# Patient Record
Sex: MALE | Race: WHITE | Employment: OTHER | URBAN - METROPOLITAN AREA
[De-identification: names, ages, dates, MRNs, and addresses within clinical notes are randomized per-mention and may not be internally consistent; named-entity substitution may affect disease eponyms.]

---

## 2020-11-11 ENCOUNTER — TRANSCRIBE ORDER (OUTPATIENT)
Dept: SCHEDULING | Age: 65
End: 2020-11-11

## 2020-11-11 ENCOUNTER — HOSPITAL ENCOUNTER (OUTPATIENT)
Dept: CT IMAGING | Age: 65
Discharge: HOME | End: 2020-11-11
Attending: FAMILY MEDICINE
Payer: COMMERCIAL

## 2020-11-11 DIAGNOSIS — R51.9 HEADACHE, UNSPECIFIED: Primary | ICD-10-CM

## 2020-11-11 DIAGNOSIS — R51.9 HEADACHE, UNSPECIFIED: ICD-10-CM

## 2020-11-11 PROCEDURE — 70450 CT HEAD/BRAIN W/O DYE: CPT

## 2023-08-18 ENCOUNTER — ANESTHESIA (OUTPATIENT)
Facility: HOSPITAL | Age: 68
End: 2023-08-18

## 2023-08-18 ENCOUNTER — ANESTHESIA EVENT (OUTPATIENT)
Facility: HOSPITAL | Age: 68
End: 2023-08-18

## 2023-08-18 ENCOUNTER — HOSPITAL ENCOUNTER (OUTPATIENT)
Facility: HOSPITAL | Age: 68
Setting detail: OUTPATIENT SURGERY
Discharge: HOME | End: 2023-08-18
Attending: INTERNAL MEDICINE | Admitting: INTERNAL MEDICINE
Payer: MEDICAID

## 2023-08-18 VITALS
RESPIRATION RATE: 18 BRPM | HEIGHT: 71 IN | OXYGEN SATURATION: 98 % | SYSTOLIC BLOOD PRESSURE: 126 MMHG | BODY MASS INDEX: 30.1 KG/M2 | DIASTOLIC BLOOD PRESSURE: 71 MMHG | TEMPERATURE: 97.4 F | WEIGHT: 215 LBS | HEART RATE: 58 BPM

## 2023-08-18 DIAGNOSIS — Z12.11 COLON CANCER SCREENING: ICD-10-CM

## 2023-08-18 DIAGNOSIS — R19.4 BOWEL HABIT CHANGES: ICD-10-CM

## 2023-08-18 PROCEDURE — 88305 TISSUE EXAM BY PATHOLOGIST: CPT

## 2023-08-18 PROCEDURE — 2580000003 HC RX 258: Performed by: INTERNAL MEDICINE

## 2023-08-18 PROCEDURE — 2709999900 HC NON-CHARGEABLE SUPPLY: Performed by: INTERNAL MEDICINE

## 2023-08-18 PROCEDURE — 7100000011 HC PHASE II RECOVERY - ADDTL 15 MIN: Performed by: INTERNAL MEDICINE

## 2023-08-18 PROCEDURE — 7100000010 HC PHASE II RECOVERY - FIRST 15 MIN: Performed by: INTERNAL MEDICINE

## 2023-08-18 PROCEDURE — 3700000000 HC ANESTHESIA ATTENDED CARE: Performed by: INTERNAL MEDICINE

## 2023-08-18 PROCEDURE — 3600007512: Performed by: INTERNAL MEDICINE

## 2023-08-18 PROCEDURE — 3600007502: Performed by: INTERNAL MEDICINE

## 2023-08-18 PROCEDURE — 3700000001 HC ADD 15 MINUTES (ANESTHESIA): Performed by: INTERNAL MEDICINE

## 2023-08-18 RX ORDER — LISINOPRIL 5 MG/1
5 TABLET ORAL DAILY
COMMUNITY
Start: 2023-05-23

## 2023-08-18 RX ORDER — ROSUVASTATIN CALCIUM 20 MG/1
20 TABLET, COATED ORAL DAILY
COMMUNITY
Start: 2023-04-14

## 2023-08-18 RX ORDER — LIDOCAINE HYDROCHLORIDE 20 MG/ML
INJECTION, SOLUTION EPIDURAL; INFILTRATION; INTRACAUDAL; PERINEURAL PRN
Status: DISCONTINUED | OUTPATIENT
Start: 2023-08-18 | End: 2023-08-18 | Stop reason: SDUPTHER

## 2023-08-18 RX ORDER — SODIUM CHLORIDE, SODIUM LACTATE, POTASSIUM CHLORIDE, CALCIUM CHLORIDE 600; 310; 30; 20 MG/100ML; MG/100ML; MG/100ML; MG/100ML
INJECTION, SOLUTION INTRAVENOUS CONTINUOUS
Status: DISCONTINUED | OUTPATIENT
Start: 2023-08-18 | End: 2023-08-18 | Stop reason: HOSPADM

## 2023-08-18 RX ADMIN — SODIUM CHLORIDE, POTASSIUM CHLORIDE, SODIUM LACTATE AND CALCIUM CHLORIDE: 600; 310; 30; 20 INJECTION, SOLUTION INTRAVENOUS at 11:16

## 2023-08-18 RX ADMIN — LIDOCAINE HYDROCHLORIDE 100 MG: 20 INJECTION, SOLUTION EPIDURAL; INFILTRATION; INTRACAUDAL; PERINEURAL at 11:18

## 2023-08-18 ASSESSMENT — PAIN - FUNCTIONAL ASSESSMENT: PAIN_FUNCTIONAL_ASSESSMENT: 0-10

## 2023-08-18 NOTE — ANESTHESIA POSTPROCEDURE EVALUATION
Department of Anesthesiology  Postprocedure Note    Patient: Indy Baird  MRN: [id number]  YOB: 1955  Date of evaluation: 8/18/2023      Procedure Summary     Date: 08/18/23 Room / Location: [facility] 01 / [facility] ENDOSCOPY    Anesthesia Start: 1116 Anesthesia Stop: 1137    Procedure: COLONOSCOPY Diagnosis:       Bowel habit changes      Colon cancer screening      (Bowel habit changes [R19.4])      (Colon cancer screening [Z12.11])    Surgeons: Evelyn Frazier MD Responsible Provider: Esteban Quinn MD    Anesthesia Type: MAC ASA Status: 2          Anesthesia Type: MAC    Rohit Phase I: Rohit Score: 10    Rohit Phase II:        Anesthesia Post Evaluation    Patient location during evaluation: bedside (Endoscopy Unit)  Patient participation: complete - patient participated  Level of consciousness: sleepy but conscious  Pain score: 0  Airway patency: patent  Nausea & Vomiting: no nausea and no vomiting  Complications: no  Cardiovascular status: hemodynamically stable  Respiratory status: acceptable  Hydration status: stable  Comments: This patient remained on the stretcher. The patient was handed off to the endoscopy nursing team.  All questions regarding pre-, intra-, and postoperative care were answered.   Multimodal analgesia pain management approach

## 2023-08-18 NOTE — PROGRESS NOTES
IV removed-- tip intact, no redness, swelling or bleeding noted. VSS. Patient in no acute distress. DC instructions reviewed with patient and wife      -- verbalized understanding. Patient wheeled out to private vehicle-- no distress noted. Awaiting pathology results. Patient tolerated both snack/drink prior to discharge.

## 2024-07-18 ENCOUNTER — HOSPITAL ENCOUNTER (EMERGENCY)
Facility: HOSPITAL | Age: 69
Discharge: HOME | End: 2024-07-18
Attending: STUDENT IN AN ORGANIZED HEALTH CARE EDUCATION/TRAINING PROGRAM
Payer: MEDICAID

## 2024-07-18 VITALS
DIASTOLIC BLOOD PRESSURE: 85 MMHG | HEART RATE: 65 BPM | SYSTOLIC BLOOD PRESSURE: 130 MMHG | TEMPERATURE: 97.7 F | BODY MASS INDEX: 32.06 KG/M2 | HEIGHT: 71 IN | RESPIRATION RATE: 16 BRPM | OXYGEN SATURATION: 96 % | WEIGHT: 229 LBS
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
## 2024-07-18 DIAGNOSIS — M79.604 RIGHT LEG PAIN: Primary | ICD-10-CM

## 2024-07-18 PROCEDURE — 99283 EMERGENCY DEPT VISIT LOW MDM: CPT

## 2024-07-18 RX ORDER — AMOXICILLIN AND CLAVULANATE POTASSIUM 875; 125 MG/1; MG/1
TABLET, FILM COATED ORAL
COMMUNITY
Start: 2024-07-17

## 2024-07-18 RX ORDER — NAPROXEN 500 MG/1
TABLET ORAL
COMMUNITY
Start: 2024-07-17

## 2024-07-18 RX ORDER — METHOCARBAMOL 750 MG/1
750 TABLET, FILM COATED ORAL 4 TIMES DAILY
Qty: 40 TABLET | Refills: 0 | Status: SHIPPED | OUTPATIENT
Start: 2024-07-18 | End: 2024-07-28

## 2024-07-18 ASSESSMENT — PAIN DESCRIPTION - ORIENTATION: ORIENTATION: RIGHT

## 2024-07-18 ASSESSMENT — PAIN - FUNCTIONAL ASSESSMENT: PAIN_FUNCTIONAL_ASSESSMENT: 0-10

## 2024-07-18 ASSESSMENT — PAIN DESCRIPTION - LOCATION: LOCATION: TOE (COMMENT WHICH ONE)

## 2024-07-18 ASSESSMENT — PAIN SCALES - GENERAL: PAINLEVEL_OUTOF10: 10

## 2024-07-18 ASSESSMENT — PAIN DESCRIPTION - DESCRIPTORS: DESCRIPTORS: DISCOMFORT;ITCHING

## 2024-07-18 NOTE — DISCHARGE INSTRUCTIONS
Thank you for choosing our Emergency Department for your care.  It is our privilege to care for you in your time of need.  In the next several days, you may receive a survey via email or mailed to your home about your experience with our team.  We would greatly appreciate you taking a few minutes to complete the survey, as we use this information to learn what we have done well and what we could be doing better. Thank you for trusting us with your care!    Below you will find a list of your tests from today's visit.   Labs  No results found for this or any previous visit (from the past 12 hour(s)).    Radiologic Studies  Vascular duplex lower extremity venous right    (Results Pending)     ------------------------------------------------------------------------------------------------------------  The evaluation and treatment you received in the Emergency Department were for an urgent problem. It is important that you follow-up with a doctor, nurse practitioner, or physician assistant to:  (1) confirm your diagnosis,  (2) re-evaluation of changes in your illness and treatment, and (3) for ongoing care. Please take your discharge instructions with you when you go to your follow-up appointment.     If you have any problem arranging a follow-up appointment, contact us!  If your symptoms become worse or you do not improve as expected, please return to us. We are available 24 hours a day.     If a prescription has been provided, please fill it as soon as possible to prevent a delay in treatment. If you have any questions or reservations about taking the medication due to side effects or interactions with other medications, please call your primary care provider or contact us directly.  Again, THANK YOU for choosing us to care for YOU!

## 2024-07-18 NOTE — ED PROVIDER NOTES
wound.  No obvious redness or any evidence of infection.  Bilateral lower extremities appear symmetric in nature.  Describes sensation in right leg is itching.  Recommend antihistamine will give muscle laxer for intermittent cramps.. Recommend doppler only if symptoms worsen.  [JT]   1621 Was discharged with Augmentin and anti-inflammatory from patient first. [JT]   1629 Order Doppler as needed if symptoms persist [JT]      ED Course User Index  [JT] Tez Cabral PA-C       SEPSIS Reassessment: Sepsis reassessment not applicable    Clinical Management Tools:  Not Applicable    Patient was given the following medications:  Medications - No data to display    CONSULTS: See ED Course/MDM for further details.  None     Social Determinants affecting Diagnosis/Treatment: None    Smoking Cessation: Not Applicable    PROCEDURES   Unless otherwise noted above, none.  Procedures      CRITICAL CARE TIME   Patient does not meet Critical Care Time, 0 minutes    ED IMPRESSION     1. Right leg pain    2. Snake bite, subsequent encounter          DISPOSITION/PLAN   DISPOSITION Decision To Discharge 07/18/2024 04:19:34 PM    Discharge Note: The patient is stable for discharge home. The signs, symptoms, diagnosis, and discharge instructions have been discussed, understanding conveyed, and agreed upon. The patient is to follow up as recommended or return to ER should their symptoms worsen.      PATIENT REFERRED TO:  Eloisa Feldman MD  [address] [facility] [address] [phone]    Schedule an appointment as soon as possible for a visit       [facility] EMERGENCY DEPARTMENT  [address] [facility] [address] [phone]    If symptoms worsen        DISCHARGE MEDICATIONS:     Medication List        START taking these medications      methocarbamol 750 MG tablet  Commonly known as: Robaxin-750  Take 1 tablet by mouth 4 times daily for 10 days            ASK your doctor about these medications

## 2025-01-02 ENCOUNTER — HOSPITAL ENCOUNTER (OUTPATIENT)
Facility: HOSPITAL | Age: 70
Discharge: HOME | End: 2025-01-04
Payer: MEDICARE

## 2025-01-02 ENCOUNTER — HOSPITAL ENCOUNTER (EMERGENCY)
Facility: HOSPITAL | Age: 70
Discharge: HOME | End: 2025-01-02
Attending: FAMILY MEDICINE
Payer: MEDICARE

## 2025-01-02 ENCOUNTER — APPOINTMENT (OUTPATIENT)
Facility: HOSPITAL | Age: 70
End: 2025-01-02
Payer: MEDICARE

## 2025-01-02 ENCOUNTER — HOSPITAL ENCOUNTER (OUTPATIENT)
Facility: HOSPITAL | Age: 70
Setting detail: OBSERVATION
Discharge: HOME | End: 2025-01-04
Attending: STUDENT IN AN ORGANIZED HEALTH CARE EDUCATION/TRAINING PROGRAM | Admitting: INTERNAL MEDICINE
Payer: MEDICARE

## 2025-01-02 VITALS
BODY MASS INDEX: 30.1 KG/M2 | DIASTOLIC BLOOD PRESSURE: 95 MMHG | HEART RATE: 78 BPM | HEIGHT: 71 IN | WEIGHT: 215 LBS | RESPIRATION RATE: 18 BRPM | TEMPERATURE: 98.5 F | SYSTOLIC BLOOD PRESSURE: 156 MMHG | OXYGEN SATURATION: 98 %

## 2025-01-02 DIAGNOSIS — M79.89 SWELLING OF LEFT LOWER EXTREMITY: Primary | ICD-10-CM

## 2025-01-02 DIAGNOSIS — I82.412 DVT OF DEEP FEMORAL VEIN, LEFT (HCC): ICD-10-CM

## 2025-01-02 DIAGNOSIS — I82.492 ACUTE DEEP VEIN THROMBOSIS (DVT) OF OTHER SPECIFIED VEIN OF LEFT LOWER EXTREMITY (HCC): Primary | ICD-10-CM

## 2025-01-02 LAB
ALBUMIN SERPL-MCNC: 3.8 G/DL (ref 3.5–5)
ALBUMIN/GLOB SERPL: 0.9 (ref 1.1–2.2)
ALP SERPL-CCNC: 114 U/L (ref 45–117)
ALT SERPL-CCNC: 56 U/L (ref 12–78)
ANION GAP SERPL CALC-SCNC: 1 MMOL/L (ref 2–12)
ANION GAP SERPL CALC-SCNC: 5 MMOL/L (ref 2–12)
APTT PPP: 29.9 SEC (ref 21.2–34.1)
AST SERPL W P-5'-P-CCNC: 25 U/L (ref 15–37)
BILIRUB SERPL-MCNC: 0.5 MG/DL (ref 0.2–1)
BUN SERPL-MCNC: 15 MG/DL (ref 6–20)
BUN SERPL-MCNC: 22 MG/DL (ref 6–20)
BUN/CREAT SERPL: 14 (ref 12–20)
BUN/CREAT SERPL: 19 (ref 12–20)
CA-I BLD-MCNC: 8.3 MG/DL (ref 8.5–10.1)
CA-I BLD-MCNC: 9.3 MG/DL (ref 8.5–10.1)
CHLORIDE SERPL-SCNC: 105 MMOL/L (ref 97–108)
CHLORIDE SERPL-SCNC: 105 MMOL/L (ref 97–108)
CO2 SERPL-SCNC: 28 MMOL/L (ref 21–32)
CO2 SERPL-SCNC: 30 MMOL/L (ref 21–32)
CREAT SERPL-MCNC: 1.08 MG/DL (ref 0.7–1.3)
CREAT SERPL-MCNC: 1.18 MG/DL (ref 0.7–1.3)
ECHO BSA: 2.21 M2
ERYTHROCYTE [DISTWIDTH] IN BLOOD BY AUTOMATED COUNT: 13.2 % (ref 11.5–14.5)
GLOBULIN SER CALC-MCNC: 4.3 G/DL (ref 2–4)
GLUCOSE SERPL-MCNC: 119 MG/DL (ref 65–100)
GLUCOSE SERPL-MCNC: 141 MG/DL (ref 65–100)
HCT VFR BLD AUTO: 51 % (ref 36.6–50.3)
HGB BLD-MCNC: 17.2 G/DL (ref 12.1–17)
INR PPP: 0.9 (ref 0.9–1.1)
MCH RBC QN AUTO: 32.5 PG (ref 26–34)
MCHC RBC AUTO-ENTMCNC: 33.7 G/DL (ref 30–36.5)
MCV RBC AUTO: 96.4 FL (ref 80–99)
NRBC # BLD: 0 K/UL (ref 0–0.01)
NRBC BLD-RTO: 0 PER 100 WBC
PLATELET # BLD AUTO: 124 K/UL (ref 150–400)
PMV BLD AUTO: 11.4 FL (ref 8.9–12.9)
POTASSIUM SERPL-SCNC: 3.6 MMOL/L (ref 3.5–5.1)
POTASSIUM SERPL-SCNC: 3.9 MMOL/L (ref 3.5–5.1)
PROT SERPL-MCNC: 8.1 G/DL (ref 6.4–8.2)
PROTHROMBIN TIME: 12.9 SEC (ref 11.9–14.6)
RBC # BLD AUTO: 5.29 M/UL (ref 4.1–5.7)
SODIUM SERPL-SCNC: 136 MMOL/L (ref 136–145)
SODIUM SERPL-SCNC: 138 MMOL/L (ref 136–145)
THERAPEUTIC RANGE: NORMAL SEC (ref 82–109)
UFH PPP CHRO-ACNC: <0.1 IU/ML
WBC # BLD AUTO: 10.1 K/UL (ref 4.1–11.1)

## 2025-01-02 PROCEDURE — 6360000004 HC RX CONTRAST MEDICATION: Performed by: STUDENT IN AN ORGANIZED HEALTH CARE EDUCATION/TRAINING PROGRAM

## 2025-01-02 PROCEDURE — 80053 COMPREHEN METABOLIC PANEL: CPT

## 2025-01-02 PROCEDURE — 85730 THROMBOPLASTIN TIME PARTIAL: CPT

## 2025-01-02 PROCEDURE — 99284 EMERGENCY DEPT VISIT MOD MDM: CPT

## 2025-01-02 PROCEDURE — 99285 EMERGENCY DEPT VISIT HI MDM: CPT

## 2025-01-02 PROCEDURE — G0378 HOSPITAL OBSERVATION PER HR: HCPCS

## 2025-01-02 PROCEDURE — 2500000003 HC RX 250 WO HCPCS: Performed by: INTERNAL MEDICINE

## 2025-01-02 PROCEDURE — 96375 TX/PRO/DX INJ NEW DRUG ADDON: CPT

## 2025-01-02 PROCEDURE — 96374 THER/PROPH/DIAG INJ IV PUSH: CPT

## 2025-01-02 PROCEDURE — 85610 PROTHROMBIN TIME: CPT

## 2025-01-02 PROCEDURE — 6360000002 HC RX W HCPCS: Performed by: STUDENT IN AN ORGANIZED HEALTH CARE EDUCATION/TRAINING PROGRAM

## 2025-01-02 PROCEDURE — 74177 CT ABD & PELVIS W/CONTRAST: CPT

## 2025-01-02 PROCEDURE — 96366 THER/PROPH/DIAG IV INF ADDON: CPT

## 2025-01-02 PROCEDURE — 6370000000 HC RX 637 (ALT 250 FOR IP): Performed by: INTERNAL MEDICINE

## 2025-01-02 PROCEDURE — 96376 TX/PRO/DX INJ SAME DRUG ADON: CPT

## 2025-01-02 PROCEDURE — 93971 EXTREMITY STUDY: CPT

## 2025-01-02 PROCEDURE — 36415 COLL VENOUS BLD VENIPUNCTURE: CPT

## 2025-01-02 PROCEDURE — 99222 1ST HOSP IP/OBS MODERATE 55: CPT | Performed by: SURGERY

## 2025-01-02 PROCEDURE — 85520 HEPARIN ASSAY: CPT

## 2025-01-02 PROCEDURE — 80048 BASIC METABOLIC PNL TOTAL CA: CPT

## 2025-01-02 PROCEDURE — 96365 THER/PROPH/DIAG IV INF INIT: CPT

## 2025-01-02 PROCEDURE — 6360000002 HC RX W HCPCS: Performed by: INTERNAL MEDICINE

## 2025-01-02 PROCEDURE — 85027 COMPLETE CBC AUTOMATED: CPT

## 2025-01-02 RX ORDER — HEPARIN SODIUM 10000 [USP'U]/100ML
5-30 INJECTION, SOLUTION INTRAVENOUS CONTINUOUS
Status: DISCONTINUED | OUTPATIENT
Start: 2025-01-02 | End: 2025-01-04

## 2025-01-02 RX ORDER — IOPAMIDOL 755 MG/ML
100 INJECTION, SOLUTION INTRAVASCULAR
Status: COMPLETED | OUTPATIENT
Start: 2025-01-02 | End: 2025-01-02

## 2025-01-02 RX ORDER — MAGNESIUM SULFATE IN WATER 40 MG/ML
2000 INJECTION, SOLUTION INTRAVENOUS PRN
Status: DISCONTINUED | OUTPATIENT
Start: 2025-01-02 | End: 2025-01-04 | Stop reason: HOSPADM

## 2025-01-02 RX ORDER — HEPARIN SODIUM 1000 [USP'U]/ML
80 INJECTION, SOLUTION INTRAVENOUS; SUBCUTANEOUS PRN
Status: DISCONTINUED | OUTPATIENT
Start: 2025-01-02 | End: 2025-01-04

## 2025-01-02 RX ORDER — ROSUVASTATIN CALCIUM 5 MG/1
20 TABLET, COATED ORAL NIGHTLY
Status: DISCONTINUED | OUTPATIENT
Start: 2025-01-02 | End: 2025-01-04 | Stop reason: HOSPADM

## 2025-01-02 RX ORDER — ONDANSETRON 2 MG/ML
4 INJECTION INTRAMUSCULAR; INTRAVENOUS EVERY 6 HOURS PRN
Status: DISCONTINUED | OUTPATIENT
Start: 2025-01-02 | End: 2025-01-04 | Stop reason: HOSPADM

## 2025-01-02 RX ORDER — HYDROCODONE BITARTRATE AND ACETAMINOPHEN 5; 325 MG/1; MG/1
1 TABLET ORAL EVERY 6 HOURS PRN
Status: DISCONTINUED | OUTPATIENT
Start: 2025-01-02 | End: 2025-01-04 | Stop reason: HOSPADM

## 2025-01-02 RX ORDER — HYDROCODONE BITARTRATE AND ACETAMINOPHEN 5; 325 MG/1; MG/1
2 TABLET ORAL EVERY 6 HOURS PRN
Status: DISCONTINUED | OUTPATIENT
Start: 2025-01-02 | End: 2025-01-04 | Stop reason: HOSPADM

## 2025-01-02 RX ORDER — SODIUM CHLORIDE 9 MG/ML
INJECTION, SOLUTION INTRAVENOUS PRN
Status: DISCONTINUED | OUTPATIENT
Start: 2025-01-02 | End: 2025-01-04 | Stop reason: HOSPADM

## 2025-01-02 RX ORDER — ACETAMINOPHEN 650 MG/1
650 SUPPOSITORY RECTAL EVERY 6 HOURS PRN
Status: DISCONTINUED | OUTPATIENT
Start: 2025-01-02 | End: 2025-01-04 | Stop reason: HOSPADM

## 2025-01-02 RX ORDER — HEPARIN SODIUM 1000 [USP'U]/ML
80 INJECTION, SOLUTION INTRAVENOUS; SUBCUTANEOUS ONCE
Status: COMPLETED | OUTPATIENT
Start: 2025-01-02 | End: 2025-01-02

## 2025-01-02 RX ORDER — LISINOPRIL 10 MG/1
5 TABLET ORAL DAILY
Status: DISCONTINUED | OUTPATIENT
Start: 2025-01-02 | End: 2025-01-04 | Stop reason: HOSPADM

## 2025-01-02 RX ORDER — POTASSIUM CHLORIDE 7.45 MG/ML
10 INJECTION INTRAVENOUS PRN
Status: DISCONTINUED | OUTPATIENT
Start: 2025-01-02 | End: 2025-01-04 | Stop reason: HOSPADM

## 2025-01-02 RX ORDER — MORPHINE SULFATE 2 MG/ML
2 INJECTION, SOLUTION INTRAMUSCULAR; INTRAVENOUS EVERY 4 HOURS PRN
Status: DISCONTINUED | OUTPATIENT
Start: 2025-01-02 | End: 2025-01-02

## 2025-01-02 RX ORDER — SODIUM CHLORIDE 0.9 % (FLUSH) 0.9 %
5-40 SYRINGE (ML) INJECTION PRN
Status: DISCONTINUED | OUTPATIENT
Start: 2025-01-02 | End: 2025-01-04 | Stop reason: HOSPADM

## 2025-01-02 RX ORDER — MORPHINE SULFATE 4 MG/ML
4 INJECTION, SOLUTION INTRAMUSCULAR; INTRAVENOUS EVERY 4 HOURS PRN
Status: DISCONTINUED | OUTPATIENT
Start: 2025-01-02 | End: 2025-01-04 | Stop reason: HOSPADM

## 2025-01-02 RX ORDER — MORPHINE SULFATE 2 MG/ML
2 INJECTION, SOLUTION INTRAMUSCULAR; INTRAVENOUS EVERY 4 HOURS PRN
Status: DISCONTINUED | OUTPATIENT
Start: 2025-01-02 | End: 2025-01-04 | Stop reason: HOSPADM

## 2025-01-02 RX ORDER — POLYETHYLENE GLYCOL 3350 17 G/17G
17 POWDER, FOR SOLUTION ORAL DAILY PRN
Status: DISCONTINUED | OUTPATIENT
Start: 2025-01-02 | End: 2025-01-04 | Stop reason: HOSPADM

## 2025-01-02 RX ORDER — SENNOSIDES 8.6 MG
650 CAPSULE ORAL EVERY 8 HOURS PRN
Qty: 12 TABLET | Refills: 0 | Status: SHIPPED | OUTPATIENT
Start: 2025-01-02 | End: 2025-01-06

## 2025-01-02 RX ORDER — HYDROCODONE BITARTRATE AND ACETAMINOPHEN 7.5; 325 MG/1; MG/1
1 TABLET ORAL EVERY 4 HOURS PRN
Status: DISCONTINUED | OUTPATIENT
Start: 2025-01-02 | End: 2025-01-02

## 2025-01-02 RX ORDER — POTASSIUM CHLORIDE 1500 MG/1
40 TABLET, EXTENDED RELEASE ORAL PRN
Status: DISCONTINUED | OUTPATIENT
Start: 2025-01-02 | End: 2025-01-04 | Stop reason: HOSPADM

## 2025-01-02 RX ORDER — SODIUM CHLORIDE 0.9 % (FLUSH) 0.9 %
5-40 SYRINGE (ML) INJECTION EVERY 12 HOURS SCHEDULED
Status: DISCONTINUED | OUTPATIENT
Start: 2025-01-02 | End: 2025-01-04 | Stop reason: HOSPADM

## 2025-01-02 RX ORDER — HEPARIN SODIUM 1000 [USP'U]/ML
40 INJECTION, SOLUTION INTRAVENOUS; SUBCUTANEOUS PRN
Status: DISCONTINUED | OUTPATIENT
Start: 2025-01-02 | End: 2025-01-04

## 2025-01-02 RX ORDER — ONDANSETRON 4 MG/1
4 TABLET, ORALLY DISINTEGRATING ORAL EVERY 8 HOURS PRN
Status: DISCONTINUED | OUTPATIENT
Start: 2025-01-02 | End: 2025-01-04 | Stop reason: HOSPADM

## 2025-01-02 RX ORDER — MORPHINE SULFATE 4 MG/ML
4 INJECTION, SOLUTION INTRAMUSCULAR; INTRAVENOUS
Status: COMPLETED | OUTPATIENT
Start: 2025-01-02 | End: 2025-01-02

## 2025-01-02 RX ORDER — ACETAMINOPHEN 325 MG/1
650 TABLET ORAL EVERY 6 HOURS PRN
Status: DISCONTINUED | OUTPATIENT
Start: 2025-01-02 | End: 2025-01-04 | Stop reason: HOSPADM

## 2025-01-02 RX ORDER — MORPHINE SULFATE 2 MG/ML
2 INJECTION, SOLUTION INTRAMUSCULAR; INTRAVENOUS
Status: COMPLETED | OUTPATIENT
Start: 2025-01-02 | End: 2025-01-02

## 2025-01-02 RX ADMIN — HEPARIN SODIUM 7800 UNITS: 1000 INJECTION INTRAVENOUS; SUBCUTANEOUS at 11:40

## 2025-01-02 RX ADMIN — HYDROCODONE BITARTRATE AND ACETAMINOPHEN 1 TABLET: 5; 325 TABLET ORAL at 23:48

## 2025-01-02 RX ADMIN — ROSUVASTATIN CALCIUM 20 MG: 5 TABLET, FILM COATED ORAL at 20:38

## 2025-01-02 RX ADMIN — MORPHINE SULFATE 4 MG: 4 INJECTION, SOLUTION INTRAMUSCULAR; INTRAVENOUS at 20:39

## 2025-01-02 RX ADMIN — IOPAMIDOL 100 ML: 755 INJECTION, SOLUTION INTRAVENOUS at 13:00

## 2025-01-02 RX ADMIN — LISINOPRIL 5 MG: 10 TABLET ORAL at 20:38

## 2025-01-02 RX ADMIN — MORPHINE SULFATE 2 MG: 2 INJECTION, SOLUTION INTRAMUSCULAR; INTRAVENOUS at 16:39

## 2025-01-02 RX ADMIN — HEPARIN SODIUM 18 UNITS/KG/HR: 10000 INJECTION, SOLUTION INTRAVENOUS at 11:43

## 2025-01-02 RX ADMIN — SODIUM CHLORIDE, PRESERVATIVE FREE 10 ML: 5 INJECTION INTRAVENOUS at 20:44

## 2025-01-02 RX ADMIN — MORPHINE SULFATE 2 MG: 2 INJECTION, SOLUTION INTRAMUSCULAR; INTRAVENOUS at 11:38

## 2025-01-02 RX ADMIN — MORPHINE SULFATE 4 MG: 4 INJECTION, SOLUTION INTRAMUSCULAR; INTRAVENOUS at 13:36

## 2025-01-02 ASSESSMENT — PAIN SCALES - GENERAL
PAINLEVEL_OUTOF10: 8
PAINLEVEL_OUTOF10: 9
PAINLEVEL_OUTOF10: 7
PAINLEVEL_OUTOF10: 10
PAINLEVEL_OUTOF10: 6
PAINLEVEL_OUTOF10: 9
PAINLEVEL_OUTOF10: 10
PAINLEVEL_OUTOF10: 6

## 2025-01-02 ASSESSMENT — PAIN DESCRIPTION - DESCRIPTORS: DESCRIPTORS: STABBING

## 2025-01-02 ASSESSMENT — LIFESTYLE VARIABLES
HOW MANY STANDARD DRINKS CONTAINING ALCOHOL DO YOU HAVE ON A TYPICAL DAY: PATIENT DOES NOT DRINK
HOW OFTEN DO YOU HAVE A DRINK CONTAINING ALCOHOL: NEVER

## 2025-01-02 ASSESSMENT — PAIN DESCRIPTION - ORIENTATION
ORIENTATION: LEFT
ORIENTATION: LEFT

## 2025-01-02 ASSESSMENT — PAIN DESCRIPTION - LOCATION
LOCATION: LEG
LOCATION: LEG

## 2025-01-02 ASSESSMENT — PAIN - FUNCTIONAL ASSESSMENT
PAIN_FUNCTIONAL_ASSESSMENT: 0-10
PAIN_FUNCTIONAL_ASSESSMENT: 0-10

## 2025-01-02 NOTE — ACP (ADVANCE CARE PLANNING)
Advance Care Planning     Advance Care Planning Inpatient Note  [facility] Department    Today's Date: 1/2/2025  Unit: [facility] EMERGENCY DEPT    Received request from patient.  Upon review of chart and communication with care team, patient's decision making abilities are not in question.. Patient and Spouse was/were present in the room during visit.    Goals of ACP Conversation:  Discuss advance care planning documents    Health Care Decision Makers:       Primary Decision Maker: Lakeisha Sanchez ISIDRO. - Child - [phone]    Secondary Decision Maker: Smid,Corinne W. - Spouse - [phone]  Summary:  Completed New Documents  Verified Healthcare Decision Maker  Updated Healthcare Decision Maker    Advance Care Planning Documents (Patient Wishes):  Healthcare Power of /Advance Directive Appointment of Health Care Agent  Living Will/Advance Directive  Anatomical Gift/Organ Donation     Assessment:   responded to pt's request to complete an Advance Medical Directive (AMD). Patient's secondary decision maker is present in the room and assists with completing the document per the patient's request. AMD completed as requested.    Interventions:  Provided education on documents for clarity and greater understanding    Care Preferences Communicated:   No    Outcomes/Plan:  ACP Discussion: Completed  New advance directive completed.  Returned original document(s) to patient, as well as copies for distribution to appointed agents  Copy of advance directive given to staff to scan into medical record.    Electronically signed by CHRISS Hart on 1/2/2025 at 4:14 PM

## 2025-01-02 NOTE — CONSULTS
11:31 AM     01/02/2025 11:31 AM    CO2 30 01/02/2025 11:31 AM    BUN 22 01/02/2025 11:31 AM     Lab Results   Component Value Date/Time    INR 0.9 01/02/2025 11:31 AM       REVIEW OF SYSTEMS   (Positive findings are bolded, all others negative)  Constitutional:  Fever, Chills, Night sweats, Unintentional weight loss, Weight gain, Anorexia, Fatigue, Somnolence  Eyes:  Vision loss, Vision change, Eye pain, Redness, Discharge  ENT:  Otalgia, Otorrhea, Hearing loss, Tinnitus, Epistaxis, Rhinorrhea, Sinus Congestion, Sore throat, Oral lesions, Tooth pain, Bleeding gums, Dysphonia, Dysphagia, Neck pain  Cardiovascular:  Chest pain, Palpitations, Dyspnea on exertion, Orthopnea, Paroxysmal nocturnal dyspnea, Leg swelling, Claudication  Respiratory:  Cough, Sputum production, Hemoptysis, Wheezing, Snoring, Shortness of breath  Gastrointestinal:  Nausea, Vomiting, Abdominal pain, Dyspepsia, Diarrhea, Constipation, Hematochezia, Melena, Encopresis  Genitourinary:  Pelvic pain, Dysuria, Frequency, Urgency, Hematuria, Retention, Incontinence, Testicular pain, Scrotal mass / Swelling, Erectile dysfunction, Menorrhagia, Metrorrhagia, Postmenopausal bleeding, Dysmenorrhea, Discharge  Musculoskeletal:  Back pain, Joint pain, Joint swelling, Muscle pain, Muscle spasm  Integumentary:  Rash, Pruritus, Dryness, Discoloration, Non-healing sores / Open wounds, Breast lumps, Mastalgia, Galactorrhea, Alopecia  Neurological:  Headache, Weakness, Paresthesias, Memory loss, Seizures, Dizziness, Syncope, Ataxia, Tremor  Psychiatric:  Anxiety, Depression, Irritability, Insomnia, Paranoia, Hallucinations, Suicidal ideations  Endocrine:  Heat / Cold intolerance, Polydipsia, Polyphagia  Hematologic / Lymphatic:  Lymphadenopathy, Bleeding disorder  Allergic / Immunologic / Infectious:  Urticaria, Seasonal / Environmental allergies, Persistent / Recurrent infection    PHYSICAL EXAM   Blood pressure 137/83, pulse 57, temperature 98.1 °F (36.7  °C), temperature source Oral, resp. rate 14, height 1.803 m (5' 11\"), weight 102.1 kg (225 lb), SpO2 95%.  General:  Calm, cooperative, NAD  HEENT:  NCAT, EOMI, conjunctiva clear, MMM  Heart:  RRR, S1S2 normal, jacqueline (50's)  Lungs:  NWOB  Abdomen:  Soft, NT, ND  Extremities:  MAEW, no cyanosis; LLE edema without warmth or redness. L DP and PT pulses 2+.  Skin:  Warm and dry, color normal, no rashes  Neurological:  AAOX3, speech clear and coherent    ASSESSMENT   This is a 69 y.o. male with LLE DVT involving L CVF (mobile non-occlusive), FV, SFV, pop V, gastroc V, soleus V, DP V and TP V.     PLAN   68 yo male with  extensive LLE DVT.  Heparin drip is first line treatment.  Recommend CT abd/pel with IV contrast with delayed venous phase (100 sec delay) to assess for thrombus above common fem V.   Mechanical thrombectomy not indicated at the moment- re-eval for thrombectomy if thrombus is present in iliac or IVC.  IVC filter not indicated as long as patient tolerates systemic anticoagulation.  Recommend Hematology consult.    Case discussed with Dr. Betsy Paul.    Thank you for asking us to participate in the care of this patient.      Deisi Montgomery PA-C  [facility]      CC:  Unruly Trejo MD

## 2025-01-02 NOTE — PROGRESS NOTES
Heparin Infusion Initiation  Shaheed Hair is a 69 y.o. male starting heparin for:  DVT  Heparin dosing: order for weight based protocol  Initial Dosing Weight: 102.1 kg (Recorded body weight)    Factor Xa inhibitor/LMWH use within the past 72 hours? No  If yes, date and time of last administration: N/A  Hypertriglyceridemia (> 690 mg/dL) or hyperbilirubinemia (> 37 mg/dL conjugated bilirubin, >14 mg/dL unconjugated bilirubin) present? No      Assessment/Plan:   Heparin to be monitored using anti-Xa for duration of therapy  Initial bolus ordered: Yes  Starting rate:  18 unit/kg/hr  PRN boluses entered: Yes

## 2025-01-02 NOTE — PROGRESS NOTES
Patient admitted to unit. Patient with family member at bedside, is alert and oriented x4 able to answer questions. Admission process in progress.

## 2025-01-02 NOTE — H&P
V2.0  History and Physical      Name:  Shaheed Hair /Age/Sex: 1955  (69 y.o. male)   MRN & CSN:  [id number] & [id number] Encounter Date/Time: 2025 4:26 PM EST   Location:   PCP: Eloisa Feldman MD       Hospital Day: 1    Assessment and Plan:   Shaheed Hair is a 69 y.o. male with a pmh of hypertension, hyperlipidemia, prediabetes mellitus, erlichiosis with recurrence who presents with pain and swelling in the left lower extremity.    Hospital Problems             Last Modified POA    * (Principal) DVT of deep femoral vein, left (HCC) 2025 Yes    Hypertension 2025 Yes    Hyperlipidemia 2025 Yes         Principal Problem:    DVT of deep femoral vein, left (HCC)  Plan:   Admit for pain control  Consult general surgery who will manage the patient long-term  Currently no indication for mechanical thrombectomy that I can determine  Will add PSA because patient does not know if it is ever been done  Have asked patient and spouse to inquire from their GI specialist when he will next be due for colonoscopy since they are not certain when that is  Continue weight-based heparin with likely transition to NOAC prior to discharge  Have discussed with the patient that likely minimum 6 months therapy is needed but with the extent of his clot some physicians might recommend even long-term anticoagulation.    Active Problems:    Hypertension  Patient not taking his medication thinking that when he takes it in his cholesterol medication that the 2 drugs together are causing his muscle pain.  I discussed with him that it is probably the cholesterol medicine and further discussed the importance of good blood pressure control.  Plan:   Patient should discuss with PCP his current management or lack of hypertension      Hyperlipidemia  Plan:   If Crestor has not already been tried and that might be a good choice as it has less of a tendency to cause myalgia.  This too should be discussed with primary care

## 2025-01-02 NOTE — PROGRESS NOTES
Spiritual Health History and Assessment/Progress Note  [facility]    Advance Care Planning,  , Adjustment to illness,      Name: Shaheed Hair MRN: [id number]    Age: 69 y.o.     Sex: male   Language: English   Catholic: None   DVT of deep femoral vein, left (HCC)     Date: 1/2/2025            Total Time Calculated: 78 min              Spiritual Assessment began in [facility] EMERGENCY DEPT        Referral/Consult From: Patient   Encounter Overview/Reason: Advance Care Planning  Service Provided For: Patient and family together    Vilma, Belief, Meaning:   Patient identifies as spiritual, is connected with a vilma tradition or spiritual practice, has beliefs or practices that help with coping during difficult times, and Other: Caodaism  Family/Friends identify as spiritual, are connected with a vilma tradition or spiritual practice, have beliefs or practices that help with coping during difficult times, and Other: Caodaism      Importance and Influence:  Patient has spiritual/personal beliefs that influence decisions regarding their health  Family/Friends have spiritual/personal beliefs that influence decisions regarding the patient's health    Community:  Patient feels well-supported. Support system includes: Spouse/Partner, Children, and Extended family  Family/Friends are connected with a spiritual community: and feel well-supported. Support system includes: Spouse/Partner, Children, and Extended family    Assessment and Plan of Care:     Patient Interventions include: Facilitated expression of thoughts and feelings, Explored spiritual coping/struggle/distress, Engaged in theological reflection, Affirmed coping skills/support systems, Facilitated life review and/ or legacy, and Other: Provided reflective listening, prayer, scripture, gentle touch, grief care, explored coping strategies, and a peaceful and supportive presence.  Family/Friends Interventions include: Facilitated expression of thoughts and  feelings, Explored spiritual coping/struggle/distress, Engaged in theological reflection, Affirmed coping skills/support systems, Facilitated life review and/or legacy, and Other: Provided reflective listening, prayer, scripture, explored coping strategies, gentle touch, and a peaceful and supportive presence.    Patient Plan of Care: Spiritual Care available upon further referral  Family/Friends Plan of Care: Spiritual Care available upon further referral     responded to pt's request to complete an Advance Medical Directive (AMD). Patient's secondary decision maker is present in the room and assists with completing the document per the patient's request. AMD completed as requested.    Pt/spouse are present in the room. They share their concerns regarding patient's current health condition. Pt discloses his journey as he recovered from the loss of his first wife. Pt/spouse discussed family dynamics, their brisa in God, and their hope for improvement in the patient's health. They are not affiliated with a Christian, however, they engage in Mandaen, prayer, and Bible reading privately in their home. Pt/spouse joined hands with the  as the  prayed; they were receptive to prayer and expressed their appreciation for the support.  Electronically signed by CHRISS Hart on 1/2/2025 at 4:19 PM

## 2025-01-02 NOTE — ED TRIAGE NOTES
Pt had vascular ultrasound this morning. Ultrasound showed +DVT throughout Left lower leg. Left Leg swelling for 2 days.

## 2025-01-02 NOTE — ED TRIAGE NOTES
Reports started yesterday with lower leg pain and swelling. Reports pain 10/10. Feels as if leg is cramping.  Reports swelling improved some with elevating leg. Denies hx of blood clot.

## 2025-01-02 NOTE — ED PROVIDER NOTES
EMERGENCY DEPARTMENT HISTORY AND PHYSICAL EXAM      Date: 1/2/2025  Patient Name: Shaheed Hair  MRN: [id number]  YOB: 1955  Date of evaluation: 1/2/2025  Provider: Unruly Trejo MD     History of Present Illness     Chief Complaint   Patient presents with    DVT       History Provided By: Patient    HPI: Shaheed Hair, 69 y.o. male with past medical history as listed and reviewed below presenting to the ED for evaluation of a DVT.  [facility].  Ultrasound was conducted and the patient was found to have a left lower extremity DVT with possible mobile area in the left common femoral.  Per the patient he reports he had the flu over the last week and was not getting out of bed much.  He developed pain over the last 2 days and had swelling in the area.    Medical History     Past Medical History:  Past Medical History:   Diagnosis Date    Arthritis     Hyperlipidemia     Hypertension        Past Surgical History:  Past Surgical History:   Procedure Laterality Date    COLONOSCOPY N/A 8/18/2023    COLONOSCOPY performed by Carole Sherwood MD at [facility] ENDOSCOPY    COLONOSCOPY N/A 8/18/2023    COLONOSCOPY WITH BIOPSY performed by Carole Sherwood MD at [facility] ENDOSCOPY       Family History:  No family history on file.    Social History:  Social History     Tobacco Use    Smoking status: Never    Smokeless tobacco: Never   Vaping Use    Vaping status: Never Used   Substance Use Topics    Alcohol use: Not Currently    Drug use: Yes     Types: Marijuana (Weed)     Comment: to sleep at night       Allergies:  No Known Allergies    PCP: Eloisa Feldman MD    Current Medications:   Current Facility-Administered Medications   Medication Dose Route Frequency Provider Last Rate Last Admin    heparin (porcine) injection 7,800 Units  80 Units/kg IntraVENous PRN Unruly Trejo MD        heparin (porcine) injection 3,900 Units  40 Units/kg IntraVENous PRN Maya  MD  01/02/25 1558

## 2025-01-02 NOTE — ED NOTES
ED TO INPATIENT SBAR HANDOFF    Patient Name: Shaheed Hair   Preferred Name: Shaheed  : 1955  69 y.o.   Family/Caregiver Present: no   Code Status Order: No Order  PO Status: NPO:No  Telemetry Order: No  C-SSRS: Risk of Suicide: No Risk  Sitter no   Restraints:     Sepsis Risk Score      Situation  Chief Complaint   Patient presents with    DVT     Brief Description of Patient's Condition: DVT in pt's left lower extremity  Mental Status: oriented and alert  Arrived from:Home  Imaging:   CT ABDOMEN PELVIS W IV CONTRAST Additional Contrast? None   Final Result         1. No evidence for venous thrombus in the IVC, iliac veins, or common femoral   veins.   2. Hepatic steatosis.         Electronically signed by Betsy Paul        Abnormal labs:   Abnormal Labs Reviewed   COMPREHENSIVE METABOLIC PANEL - Abnormal; Notable for the following components:       Result Value    Anion Gap 1 (*)     Glucose 119 (*)     BUN 22 (*)     Globulin 4.3 (*)     Albumin/Globulin Ratio 0.9 (*)     All other components within normal limits   CBC - Abnormal; Notable for the following components:    Hemoglobin 17.2 (*)     Hematocrit 51.0 (*)     Platelets 124 (*)     All other components within normal limits       Background  Allergies: No Known Allergies  History:   Past Medical History:   Diagnosis Date    Arthritis     Hyperlipidemia     Hypertension        Assessment  Vitals: MEWS Score: 0  Level of Consciousness: Alert (0)   Vitals:    25 1630 25 1645 25 1700 25 1711   BP: 139/85  (!) 143/81    Pulse: 58 72 58 58   Resp:       Temp:       TempSrc:       SpO2: 94% 97% 95% 97%   Weight:       Height:         Deterioration Index (DI): Deterioration Index: 23.05  Deterioration Index (DI) Interventions Performed:    O2 Flow Rate:    O2 Device: O2 Device: None (Room air)  Cardiac Rhythm:    Critical Lab Results: [unfilled]  Cultures: Cultures:None  NIH Score: NIH     Active LDA's:   Peripheral IV 25  Left;Proximal;Anterior Forearm (Active)       Peripheral IV 01/02/25 Proximal;Right;Anterior Forearm (Active)     Active Central Lines:                          Active Wounds:    Active Chi's:    Active Feeding Tubes:      Administered Medications:   Medications   heparin (porcine) injection 7,800 Units (has no administration in time range)   heparin (porcine) injection 3,900 Units (has no administration in time range)   heparin 25,000 units in dextrose 5% 250 mL (premix) infusion (18 Units/kg/hr × 97.5 kg IntraVENous New Bag 1/2/25 1143)   HYDROcodone-acetaminophen (NORCO) 7.5-325 MG per tablet 1 tablet (has no administration in time range)     And   morphine (PF) injection 2 mg (2 mg IntraVENous Given 1/2/25 1639)   heparin (porcine) injection 7,800 Units (7,800 Units IntraVENous Given 1/2/25 1140)   morphine (PF) injection 2 mg (2 mg IntraVENous Given 1/2/25 1138)   iopamidol (ISOVUE-370) 76 % injection 100 mL (100 mLs IntraVENous Given 1/2/25 1300)   morphine (PF) injection 4 mg (4 mg IntraVENous Given 1/2/25 1336)     Last documented pain medication administration: morphine IV @ 1639  Pertinent or High Risk Medications/Drips: no   If Yes, please provide details: heprin drip running at 18 units/kg/hr  Blood Product Administration: no  If Yes, please provide details: n/a  Process Protocols/Bundles: Sepsis Protocol/Bundle Completion-DONE!    Recommendation  Incomplete STAT orders: see chart  Overdue Medications: see MAR  Patient Belongings:    Additional Comments: drawing blue top before sending up, next will be at [phone]  If any further questions, please call Sending RN at [phone]      Admitting Unit Notification  Name of person notified and time: Vashti      Electronically signed by: Electronically signed by Dominic Cornejo RN on 1/2/2025 at 5:14 PM

## 2025-01-02 NOTE — ED PROVIDER NOTES
EMERGENCY DEPARTMENT HISTORY AND PHYSICAL EXAM      Date: 1/2/2025  Patient Name: Shaheed Hair    History of Presenting Illness     Chief Complaint   Patient presents with    Leg Pain       History Provided By:     HPI: Shaheed Hair, is a very pleasant 69 y.o. male presenting to the ED with a chief complaint of leg pain.  Recently developed left lower calf pain and swelling.  No redness nor warmth.  No symptoms in either leg.  No chest pain or shortness of breath.  No orthopnea.  Denies injuries.  No numbness tingling or weakness in extremity.    Denies any other symptoms at this time.    PCP: Eloisa Feldman MD    No current facility-administered medications on file prior to encounter.     Current Outpatient Medications on File Prior to Encounter   Medication Sig Dispense Refill    amoxicillin-clavulanate (AUGMENTIN) 875-125 MG per tablet       naproxen (NAPROSYN) 500 MG tablet       lisinopril (PRINIVIL;ZESTRIL) 5 MG tablet Take 1 tablet by mouth daily      rosuvastatin (CRESTOR) 20 MG tablet Take 1 tablet by mouth Daily         Past History     Past Medical History:  Past Medical History:   Diagnosis Date    Arthritis     Hyperlipidemia     Hypertension        Past Surgical History:  Past Surgical History:   Procedure Laterality Date    COLONOSCOPY N/A 8/18/2023    COLONOSCOPY performed by Carole Sherwood MD at [facility] ENDOSCOPY    COLONOSCOPY N/A 8/18/2023    COLONOSCOPY WITH BIOPSY performed by Carole Sherwood MD at [facility] ENDOSCOPY       Family History:  History reviewed. No pertinent family history.    Social History:  Social History     Tobacco Use    Smoking status: Never    Smokeless tobacco: Never   Vaping Use    Vaping status: Never Used   Substance Use Topics    Alcohol use: Not Currently    Drug use: Yes     Types: Marijuana (Weed)     Comment: to sleep at night       Allergies:  No Known Allergies      Review of Systems     Negative unless otherwise stated in HPI    Physical Exam     Physical

## 2025-01-03 LAB
BASOPHILS # BLD: 0.1 K/UL (ref 0–0.1)
BASOPHILS NFR BLD: 1 % (ref 0–1)
DIFFERENTIAL METHOD BLD: ABNORMAL
EOSINOPHIL # BLD: 0.2 K/UL (ref 0–0.4)
EOSINOPHIL NFR BLD: 2 % (ref 0–7)
ERYTHROCYTE [DISTWIDTH] IN BLOOD BY AUTOMATED COUNT: 13 % (ref 11.5–14.5)
HCT VFR BLD AUTO: 45.5 % (ref 36.6–50.3)
HGB BLD-MCNC: 15.4 G/DL (ref 12.1–17)
IMM GRANULOCYTES # BLD AUTO: 0 K/UL (ref 0–0.04)
IMM GRANULOCYTES NFR BLD AUTO: 1 % (ref 0–0.5)
LYMPHOCYTES # BLD: 2.2 K/UL (ref 0.8–3.5)
LYMPHOCYTES NFR BLD: 26 % (ref 12–49)
MCH RBC QN AUTO: 32.3 PG (ref 26–34)
MCHC RBC AUTO-ENTMCNC: 33.8 G/DL (ref 30–36.5)
MCV RBC AUTO: 95.4 FL (ref 80–99)
MONOCYTES # BLD: 0.9 K/UL (ref 0–1)
MONOCYTES NFR BLD: 11 % (ref 5–13)
NEUTS SEG # BLD: 5 K/UL (ref 1.8–8)
NEUTS SEG NFR BLD: 59 % (ref 32–75)
NRBC # BLD: 0 K/UL (ref 0–0.01)
NRBC BLD-RTO: 0 PER 100 WBC
PLATELET # BLD AUTO: 117 K/UL (ref 150–400)
PMV BLD AUTO: 11.8 FL (ref 8.9–12.9)
RBC # BLD AUTO: 4.77 M/UL (ref 4.1–5.7)
UFH PPP CHRO-ACNC: 0.5 IU/ML
UFH PPP CHRO-ACNC: 0.56 IU/ML
UFH PPP CHRO-ACNC: 0.73 IU/ML
UFH PPP CHRO-ACNC: 0.73 IU/ML
WBC # BLD AUTO: 8.3 K/UL (ref 4.1–11.1)

## 2025-01-03 PROCEDURE — 85520 HEPARIN ASSAY: CPT

## 2025-01-03 PROCEDURE — 96376 TX/PRO/DX INJ SAME DRUG ADON: CPT

## 2025-01-03 PROCEDURE — 2500000003 HC RX 250 WO HCPCS: Performed by: INTERNAL MEDICINE

## 2025-01-03 PROCEDURE — G0103 PSA SCREENING: HCPCS

## 2025-01-03 PROCEDURE — G0378 HOSPITAL OBSERVATION PER HR: HCPCS

## 2025-01-03 PROCEDURE — 36415 COLL VENOUS BLD VENIPUNCTURE: CPT

## 2025-01-03 PROCEDURE — 99232 SBSQ HOSP IP/OBS MODERATE 35: CPT | Performed by: SURGERY

## 2025-01-03 PROCEDURE — 6360000002 HC RX W HCPCS: Performed by: STUDENT IN AN ORGANIZED HEALTH CARE EDUCATION/TRAINING PROGRAM

## 2025-01-03 PROCEDURE — 6360000002 HC RX W HCPCS: Performed by: INTERNAL MEDICINE

## 2025-01-03 PROCEDURE — 96366 THER/PROPH/DIAG IV INF ADDON: CPT

## 2025-01-03 PROCEDURE — 6370000000 HC RX 637 (ALT 250 FOR IP): Performed by: INTERNAL MEDICINE

## 2025-01-03 PROCEDURE — 85025 COMPLETE CBC W/AUTO DIFF WBC: CPT

## 2025-01-03 RX ADMIN — HYDROCODONE BITARTRATE AND ACETAMINOPHEN 1 TABLET: 5; 325 TABLET ORAL at 16:56

## 2025-01-03 RX ADMIN — HEPARIN SODIUM 17 UNITS/KG/HR: 10000 INJECTION, SOLUTION INTRAVENOUS at 15:50

## 2025-01-03 RX ADMIN — LISINOPRIL 5 MG: 10 TABLET ORAL at 09:32

## 2025-01-03 RX ADMIN — SODIUM CHLORIDE, PRESERVATIVE FREE 10 ML: 5 INJECTION INTRAVENOUS at 22:13

## 2025-01-03 RX ADMIN — HYDROCODONE BITARTRATE AND ACETAMINOPHEN 1 TABLET: 5; 325 TABLET ORAL at 23:07

## 2025-01-03 RX ADMIN — HEPARIN SODIUM 18 UNITS/KG/HR: 10000 INJECTION, SOLUTION INTRAVENOUS at 02:13

## 2025-01-03 RX ADMIN — MORPHINE SULFATE 2 MG: 2 INJECTION, SOLUTION INTRAMUSCULAR; INTRAVENOUS at 08:03

## 2025-01-03 RX ADMIN — ROSUVASTATIN CALCIUM 20 MG: 5 TABLET, FILM COATED ORAL at 22:12

## 2025-01-03 RX ADMIN — SODIUM CHLORIDE, PRESERVATIVE FREE 10 ML: 5 INJECTION INTRAVENOUS at 09:32

## 2025-01-03 ASSESSMENT — ENCOUNTER SYMPTOMS
SHORTNESS OF BREATH: 0
ABDOMINAL PAIN: 0
GASTROINTESTINAL NEGATIVE: 1
RESPIRATORY NEGATIVE: 1
COUGH: 0
NAUSEA: 0
VOMITING: 0

## 2025-01-03 ASSESSMENT — PAIN DESCRIPTION - ORIENTATION
ORIENTATION: LEFT
ORIENTATION: LEFT

## 2025-01-03 ASSESSMENT — PAIN SCALES - GENERAL
PAINLEVEL_OUTOF10: 3
PAINLEVEL_OUTOF10: 6
PAINLEVEL_OUTOF10: 6
PAINLEVEL_OUTOF10: 4
PAINLEVEL_OUTOF10: 6
PAINLEVEL_OUTOF10: 3
PAINLEVEL_OUTOF10: 2

## 2025-01-03 ASSESSMENT — PAIN DESCRIPTION - DESCRIPTORS
DESCRIPTORS: ACHING
DESCRIPTORS: ACHING

## 2025-01-03 ASSESSMENT — PAIN DESCRIPTION - LOCATION
LOCATION: LEG
LOCATION: LEG

## 2025-01-03 ASSESSMENT — PAIN SCALES - WONG BAKER: WONGBAKER_NUMERICALRESPONSE: HURTS A LITTLE BIT

## 2025-01-03 NOTE — PROGRESS NOTES
PROGRESS NOTE      Chief Complaints:  No new complaint.  HPI and  Objective:    Pain is better.  Denies any chest pain shortness breath.  Review of Systems:  Rest of review of system reviewed personally and they are negative.    EXAM:  /61   Pulse 57   Temp 98.1 °F (36.7 °C) (Oral)   Resp 14   Ht 1.803 m (5' 11\")   Wt 102.1 kg (225 lb)   SpO2 92%   BMI 31.38 kg/m²     Patient is awake   Head and neck atraumatic, normocephalic.  ENT: No hoarse voice, gaze appropriate.  Cardiac system regular rate rhythm.  Pulmonary no audible wheeze, no cyanosis.  Chest wall excursion normal with respiration cycle  Abdomen is soft not particularly distended.  Neurologically nonfocal.  Hematology system: No bruising noted.  Musculoskeletal system: No joint deformity noted.  Genitourinary system: No hematuria noted.  Skin is warm and moist.  Psychosocial: Cooperative.  Vascular examination as previously noted no changes.    Current Facility-Administered Medications   Medication Dose Route Frequency Provider Last Rate Last Admin    heparin (porcine) injection 7,800 Units  80 Units/kg IntraVENous PRN Unruly Trejo MD        heparin (porcine) injection 3,900 Units  40 Units/kg IntraVENous PRN Unruly Trejo MD        heparin 25,000 units in dextrose 5% 250 mL (premix) infusion  5-30 Units/kg/hr IntraVENous Continuous Unruly Trejo MD 17.6 mL/hr at 01/03/25 0213 18 Units/kg/hr at 01/03/25 0213    lisinopril (PRINIVIL;ZESTRIL) tablet 5 mg  5 mg Oral Daily Lauri Jha MD   5 mg at 01/02/25 2038    rosuvastatin (CRESTOR) tablet 20 mg  20 mg Oral Nightly Lauri Jha MD   20 mg at 01/02/25 2038    sodium chloride flush 0.9 % injection 5-40 mL  5-40 mL IntraVENous 2 times per day Lauri Jha MD   10 mL at 01/02/25 2044    sodium chloride flush 0.9 % injection 5-40 mL  5-40 mL IntraVENous PRN Lauri Jha MD        0.9 % sodium chloride infusion   IntraVENous PRN Lauri Jha  MD        potassium chloride (KLOR-CON M) extended release tablet 40 mEq  40 mEq Oral PRN Lauri Jha MD        Or    potassium bicarb-citric acid (EFFER-K) effervescent tablet 40 mEq  40 mEq Oral PRN Lauri Jha MD        Or    potassium chloride 10 mEq/100 mL IVPB (Peripheral Line)  10 mEq IntraVENous PRN Lauri Jha MD        magnesium sulfate 2000 mg in 50 mL IVPB premix  2,000 mg IntraVENous PRN Lauri Jha MD        ondansetron (ZOFRAN-ODT) disintegrating tablet 4 mg  4 mg Oral Q8H PRN Lauri Jha MD        Or    ondansetron (ZOFRAN) injection 4 mg  4 mg IntraVENous Q6H PRN Larui Jha MD        polyethylene glycol (GLYCOLAX) packet 17 g  17 g Oral Daily PRN Lauri Jha MD        acetaminophen (TYLENOL) tablet 650 mg  650 mg Oral Q6H PRN Lauri Jha MD        Or    acetaminophen (TYLENOL) suppository 650 mg  650 mg Rectal Q6H PRN Lauri Jha MD        HYDROcodone-acetaminophen (NORCO) 5-325 MG per tablet 1 tablet  1 tablet Oral Q6H PRN Lauri Jha MD   1 tablet at 01/02/25 2348    And    HYDROcodone-acetaminophen (NORCO) 5-325 MG per tablet 2 tablet  2 tablet Oral Q6H PRN Lauri Jha MD        morphine (PF) injection 2 mg  2 mg IntraVENous Q4H PRN Lauri Jha MD        And    morphine (PF) injection 4 mg  4 mg IntraVENous Q4H PRN Lauri Jha MD   4 mg at 01/02/25 2039           Recent Results (from the past 24 hour(s))   Vascular duplex lower extremity venous left    Collection Time: 01/02/25 10:50 AM   Result Value Ref Range    Body Surface Area 2.21 m2   Comprehensive Metabolic Panel    Collection Time: 01/02/25 11:31 AM   Result Value Ref Range    Sodium 136 136 - 145 mmol/L    Potassium 3.9 3.5 - 5.1 mmol/L    Chloride 105 97 - 108 mmol/L    CO2 30 21 - 32 mmol/L    Anion Gap 1 (L) 2 - 12 mmol/L    Glucose 119 (H) 65 - 100 mg/dL    BUN 22 (H) 6 - 20 mg/dL    Creatinine 1.18 0.70 - 1.30 mg/dL

## 2025-01-03 NOTE — CARE COORDINATION
01/03/25 1347   Service Assessment   Patient Orientation Alert and Oriented   Cognition Alert   History Provided By Patient   Primary Caregiver Self   Accompanied By/Relationship alone   Support Systems Family Members;Spouse/Significant Other   Patient's Healthcare Decision Maker is: Named in Scanned ACP Document   PCP Verified by CM Yes   Last Visit to PCP Within last 6 months   Prior Functional Level Independent in ADLs/IADLs   Current Functional Level Independent in ADLs/IADLs   Can patient return to prior living arrangement Yes   Ability to make needs known: Good   Family able to assist with home care needs: Yes   Would you like for me to discuss the discharge plan with any other family members/significant others, and if so, who? Yes   Financial Resources Medicare;Medicaid   Community Resources None     In observation status. Lives at address on file. Independent at baseline, uses walking staff.    Medicaid for transport.     Discharge plan is home once clinically stable.     Advance Care Planning   Healthcare Decision Maker:    Primary Decision Maker: Lakeisha Sanchez. - Child - [phone]    Secondary Decision Maker: Smid,Corinne W. - Spouse - [phone]    Click here to complete Healthcare Decision Makers including selection of the Healthcare Decision Maker Relationship (ie \"Primary\").

## 2025-01-03 NOTE — CONSULTS
General surgery history and Physical    Patient: Shaheed Hair  MRN: [id number]    YOB: 1955  Age: 69 y.o.  Sex: male     Chief Complaint:  Chief Complaint   Patient presents with    DVT       History of Present Illness: Shaheed Hair is a 69 y.o. very pleasant gentleman examined at the emergency room.  I was consulted for left leg DVT.    Patient otherwise pretty active healthy gentleman had a flu symptoms for about a week and after that patient noted to have left leg swelling and pain.  Patient denies any chest pain shortness of breath.  Patient denies prior history of trauma.  Denies any personal history of DVT or family history of DVT.  Denies a personal history of any cancer.        Social History:  Social Connections: Not on file       Past Medical History:  Past Medical History:   Diagnosis Date    Arthritis     Hyperlipidemia     Hypertension      Surgical History:  Past Surgical History:   Procedure Laterality Date    COLONOSCOPY N/A 8/18/2023    COLONOSCOPY performed by Carole Sherwood MD at [facility] ENDOSCOPY    COLONOSCOPY N/A 8/18/2023    COLONOSCOPY WITH BIOPSY performed by Carole Sherwood MD at [facility] ENDOSCOPY       Allergies:  No Known Allergies    Current Meds:  Current Facility-Administered Medications   Medication Dose Route Frequency Provider Last Rate Last Admin    heparin (porcine) injection 7,800 Units  80 Units/kg IntraVENous PRN Unruly Trejo MD        heparin (porcine) injection 3,900 Units  40 Units/kg IntraVENous PRN Unruly Trejo MD        heparin 25,000 units in dextrose 5% 250 mL (premix) infusion  5-30 Units/kg/hr IntraVENous Continuous Unruly Trejo MD 17.6 mL/hr at 01/03/25 0213 18 Units/kg/hr at 01/03/25 0213    lisinopril (PRINIVIL;ZESTRIL) tablet 5 mg  5 mg Oral Daily Lauri Jha MD   5 mg at 01/02/25 2038    rosuvastatin (CRESTOR) tablet 20 mg  20 mg Oral Nightly Lauri Jha MD   20 mg at 01/02/25 2038    sodium chloride flush 0.9 % injection 5-40  lb)   SpO2 92%   BMI 31.38 kg/m²   Gen: Well developed, well nourished 69 y.o. male in no acute distress  Head: normocephalic, atraumatic  EYES: Pupils are equal and reactive.  Chest wall: Excursion normal with respiration cycle, no obvious audible wheeze.  Mouth: Clear, no overt lesions, oral mucosa pink and moist  Neck: supple, no masses, no adenopathy or carotid bruits, trachea midline  Resp: clear to auscultation bilaterally, no wheeze, rhonchi or rales, excursions normal and symmetrical  Cardio: Regular rate and rhythm, no murmurs, clicks, gallops or rubs, no edema or varicosities  Abdomen: Soft nontender  Neuro: sensation and strength grossly intact and symmetrical  Psych: alert and oriented to person, place and time  Skin: warm and moist.  Hematologic system: No bruising.  Vascular examination: Intact in lower extremity.  Labs:  Recent Results (from the past 24 hour(s))   Vascular duplex lower extremity venous left    Collection Time: 01/02/25 10:50 AM   Result Value Ref Range    Body Surface Area 2.21 m2   Comprehensive Metabolic Panel    Collection Time: 01/02/25 11:31 AM   Result Value Ref Range    Sodium 136 136 - 145 mmol/L    Potassium 3.9 3.5 - 5.1 mmol/L    Chloride 105 97 - 108 mmol/L    CO2 30 21 - 32 mmol/L    Anion Gap 1 (L) 2 - 12 mmol/L    Glucose 119 (H) 65 - 100 mg/dL    BUN 22 (H) 6 - 20 mg/dL    Creatinine 1.18 0.70 - 1.30 mg/dL    BUN/Creatinine Ratio 19 12 - 20      Est, Glom Filt Rate 66 >60 ml/min/1.73m2    Calcium 9.3 8.5 - 10.1 mg/dL    Total Bilirubin 0.5 0.2 - 1.0 mg/dL    AST 25 15 - 37 U/L    ALT 56 12 - 78 U/L    Alk Phosphatase 114 45 - 117 U/L    Total Protein 8.1 6.4 - 8.2 g/dL    Albumin 3.8 3.5 - 5.0 g/dL    Globulin 4.3 (H) 2.0 - 4.0 g/dL    Albumin/Globulin Ratio 0.9 (L) 1.1 - 2.2     CBC    Collection Time: 01/02/25 11:31 AM   Result Value Ref Range    WBC 10.1 4.1 - 11.1 K/uL    RBC 5.29 4.10 - 5.70 M/uL    Hemoglobin 17.2 (H) 12.1 - 17.0 g/dL    Hematocrit 51.0 (H)

## 2025-01-04 VITALS
SYSTOLIC BLOOD PRESSURE: 115 MMHG | HEIGHT: 71 IN | DIASTOLIC BLOOD PRESSURE: 63 MMHG | BODY MASS INDEX: 31.5 KG/M2 | HEART RATE: 50 BPM | TEMPERATURE: 97.9 F | WEIGHT: 225 LBS | OXYGEN SATURATION: 97 % | RESPIRATION RATE: 17 BRPM

## 2025-01-04 LAB
ANION GAP SERPL CALC-SCNC: 5 MMOL/L (ref 2–12)
BASOPHILS # BLD: 0.1 K/UL (ref 0–0.1)
BASOPHILS NFR BLD: 1 % (ref 0–1)
BUN SERPL-MCNC: 16 MG/DL (ref 6–20)
BUN/CREAT SERPL: 15 (ref 12–20)
CA-I BLD-MCNC: 9.1 MG/DL (ref 8.5–10.1)
CHLORIDE SERPL-SCNC: 106 MMOL/L (ref 97–108)
CO2 SERPL-SCNC: 26 MMOL/L (ref 21–32)
CREAT SERPL-MCNC: 1.06 MG/DL (ref 0.7–1.3)
DIFFERENTIAL METHOD BLD: ABNORMAL
EOSINOPHIL # BLD: 0.2 K/UL (ref 0–0.4)
EOSINOPHIL NFR BLD: 3 % (ref 0–7)
ERYTHROCYTE [DISTWIDTH] IN BLOOD BY AUTOMATED COUNT: 13 % (ref 11.5–14.5)
GLUCOSE SERPL-MCNC: 111 MG/DL (ref 65–100)
HCT VFR BLD AUTO: 51.3 % (ref 36.6–50.3)
HGB BLD-MCNC: 17.1 G/DL (ref 12.1–17)
IMM GRANULOCYTES # BLD AUTO: 0.1 K/UL (ref 0–0.04)
IMM GRANULOCYTES NFR BLD AUTO: 1 % (ref 0–0.5)
LYMPHOCYTES # BLD: 2.3 K/UL (ref 0.8–3.5)
LYMPHOCYTES NFR BLD: 31 % (ref 12–49)
MCH RBC QN AUTO: 32.1 PG (ref 26–34)
MCHC RBC AUTO-ENTMCNC: 33.3 G/DL (ref 30–36.5)
MCV RBC AUTO: 96.2 FL (ref 80–99)
MONOCYTES # BLD: 0.9 K/UL (ref 0–1)
MONOCYTES NFR BLD: 12 % (ref 5–13)
NEUTS SEG # BLD: 4.1 K/UL (ref 1.8–8)
NEUTS SEG NFR BLD: 52 % (ref 32–75)
NRBC # BLD: 0 K/UL (ref 0–0.01)
NRBC BLD-RTO: 0 PER 100 WBC
PLATELET # BLD AUTO: 126 K/UL (ref 150–400)
PMV BLD AUTO: 11.2 FL (ref 8.9–12.9)
POTASSIUM SERPL-SCNC: 4.2 MMOL/L (ref 3.5–5.1)
PSA SERPL-MCNC: 0.2 NG/ML (ref 0.01–4)
RBC # BLD AUTO: 5.33 M/UL (ref 4.1–5.7)
SODIUM SERPL-SCNC: 137 MMOL/L (ref 136–145)
UFH PPP CHRO-ACNC: 0.57 IU/ML
WBC # BLD AUTO: 7.6 K/UL (ref 4.1–11.1)

## 2025-01-04 PROCEDURE — 85520 HEPARIN ASSAY: CPT

## 2025-01-04 PROCEDURE — 36415 COLL VENOUS BLD VENIPUNCTURE: CPT

## 2025-01-04 PROCEDURE — 80048 BASIC METABOLIC PNL TOTAL CA: CPT

## 2025-01-04 PROCEDURE — G0378 HOSPITAL OBSERVATION PER HR: HCPCS

## 2025-01-04 PROCEDURE — 6370000000 HC RX 637 (ALT 250 FOR IP)

## 2025-01-04 PROCEDURE — 96366 THER/PROPH/DIAG IV INF ADDON: CPT

## 2025-01-04 PROCEDURE — 99232 SBSQ HOSP IP/OBS MODERATE 35: CPT | Performed by: SURGERY

## 2025-01-04 PROCEDURE — 85025 COMPLETE CBC W/AUTO DIFF WBC: CPT

## 2025-01-04 PROCEDURE — 6370000000 HC RX 637 (ALT 250 FOR IP): Performed by: INTERNAL MEDICINE

## 2025-01-04 PROCEDURE — 6360000002 HC RX W HCPCS: Performed by: STUDENT IN AN ORGANIZED HEALTH CARE EDUCATION/TRAINING PROGRAM

## 2025-01-04 RX ORDER — HYDROCODONE BITARTRATE AND ACETAMINOPHEN 5; 325 MG/1; MG/1
1 TABLET ORAL EVERY 8 HOURS PRN
Qty: 9 TABLET | Refills: 0 | Status: SHIPPED | OUTPATIENT
Start: 2025-01-04 | End: 2025-01-07

## 2025-01-04 RX ADMIN — HEPARIN SODIUM 17 UNITS/KG/HR: 10000 INJECTION, SOLUTION INTRAVENOUS at 08:46

## 2025-01-04 RX ADMIN — APIXABAN 5 MG: 5 TABLET, FILM COATED ORAL at 12:36

## 2025-01-04 RX ADMIN — LISINOPRIL 5 MG: 10 TABLET ORAL at 08:50

## 2025-01-04 RX ADMIN — HYDROCODONE BITARTRATE AND ACETAMINOPHEN 1 TABLET: 5; 325 TABLET ORAL at 14:33

## 2025-01-04 ASSESSMENT — PAIN DESCRIPTION - LOCATION: LOCATION: LEG

## 2025-01-04 ASSESSMENT — PAIN SCALES - GENERAL: PAINLEVEL_OUTOF10: 8

## 2025-01-04 ASSESSMENT — PAIN DESCRIPTION - ORIENTATION: ORIENTATION: LEFT

## 2025-01-04 NOTE — CARE COORDINATION
DC order observed.     No further needs from CM at this time. Pt is cleared for dc from CM standpoint.       Transition of Care Plan:    RUR: n/a, obs  Prior Level of Functioning: independent  Disposition: home self care  YAHIR: 1/4/2025  Follow up appointments: unit secretary or primary nurse to setup as needed  DME needed: none  Transportation at discharge: arranged by pt  IM/IMM Medicare/[employer] letter given: n/a, obs  Is patient a  and connected with [facility]? no  Caregiver Contact: n/a  Care Conference needed? no  Barriers to discharge: none

## 2025-01-04 NOTE — DISCHARGE SUMMARY
Discharge Summary    Name: Shaheed Hair  [id number]  YOB: 1955 (Age: 69 y.o.)   Date of Admission: 1/2/2025  Date of Discharge: 1/4/2025  Attending Physician: Nilson Ortiz MD    Discharge Diagnosis:   Principal Problem:    DVT of deep femoral vein, left (HCC)  Active Problems:    Hypertension    Hyperlipidemia  Resolved Problems:    * No resolved hospital problems. *  Left-sided acute occlusive DVT from proximal to distal femoral, popliteal, peroneal, and soleal veins  Hypertension  Hyperlipidemia  History of ehrlichiosis, recurrent    Consultations:  IP CONSULT TO SPIRITUAL SERVICES  IP CONSULT TO GENERAL SURGERY      Brief Admission History/Reason for Admission Per Lauri Jha MD:   Left leg pain and swelling    Brief Hospital Course by Main Problems:   Shaheed Hair 69-year-old male with history of hypertension, hyperlipidemia, recurrent ehrlichiosis and arthritis who is presented to the ED on 1/2 for pain and swelling in the left lower extremity x 2 days.  Reports recent STEMI immobilization in bed with flulike symptoms.  Denies chest pain or shortness of breath.  Left leg venous duplex ultrasound revealed extensive, acute occlusive DVT from proximal to distal femoral vein, popliteal vein, peroneal vein, and soleal vein.  CT abdomen pelvis with IV contrast showed no evidence for venous thrombosis in the IVC, iliac veins, or common femoral vein.  Initial lab work and vital signs otherwise unremarkable.  Initiated on IV heparin and given morphine for pain control.  Denies personal or family history of clotting disorders.  Denies symptoms suggestive of malignancy such as anorexia, weight loss, fatigue, chills or nighttime diaphoresis.  Prior history of colon polyps, but otherwise no history of cancer.  PSA normal.  Vascular surgery consulted.  Does not appear to have signs of phlegmasia.  Continue elevation of left leg and using MICHAEL hose at discharge.   Transitioned from IV heparin to Eliquis today, will discharge on Eliquis 5 mg twice daily for up to 6 months.  Will need repeat venous ultrasound 3 to 6 months.  Follow-up with vascular surgery in 2 weeks.    Discussed return precautions including increased leg pain, swelling, color change, shortness of breath, cough, chest pain.  Patient states understanding.  Confirmed with patient's pharmacy that Eliquis has been covered by his insurance.  Advised patient to follow-up with PCP in 10 to 14 days with recent admission for continued monitoring and management of medications and other comorbidities.  Follow-up with vascular surgery in 2 weeks.  Discussed discharge plan with patient, which patient agreed to.  All questions answered and patient will be discharged home at this time.    Discharge Exam:  Patient seen and examined by me on discharge day.  No new complaints.  Reports improvement in leg swelling and pain.    Pertinent Findings:  Patient Vitals for the past 24 hrs:   BP Temp Temp src Pulse Resp SpO2   01/04/25 0845 -- 97.9 °F (36.6 °C) Oral -- -- --   01/04/25 0747 115/63 (!) 102 °F (38.9 °C) Oral 50 17 97 %   01/04/25 0245 117/70 98 °F (36.7 °C) Oral 62 15 92 %   01/03/25 2337 -- -- -- -- 16 --   01/03/25 2307 -- -- -- -- 17 --   01/03/25 1923 (!) 101/55 98.4 °F (36.9 °C) Oral 56 18 93 %   01/03/25 1506 129/74 98.4 °F (36.9 °C) Oral 59 16 98 %       Gen:    Not in distress  Chest: Clear lungs  CVS:   Regular rhythm.  No edema  Abd:  Soft, not distended, not tender  Neuro: AOx3, moving all extremities    Discharge/Recent Laboratory Results:  Recent Labs     01/04/25  0723      K 4.2      CO2 26   BUN 16   CREATININE 1.06   GLUCOSE 111*   CALCIUM 9.1     Recent Labs     01/04/25  0723   HGB 17.1*   HCT 51.3*   WBC 7.6   *       Discharge Medications:     Medication List        START taking these medications      apixaban 5 MG Tabs tablet  Commonly known as: ELIQUIS  Take 1 tablet by mouth 2  times daily     HYDROcodone-acetaminophen 5-325 MG per tablet  Commonly known as: NORCO  Take 1 tablet by mouth every 8 hours as needed for Pain for up to 3 days. Max Daily Amount: 3 tablets            CHANGE how you take these medications      acetaminophen 650 MG extended release tablet  Commonly known as: Tylenol 8 Hour  Take 1 tablet by mouth every 8 hours as needed for Pain  What changed: how much to take            CONTINUE taking these medications      lisinopril 5 MG tablet  Commonly known as: PRINIVIL;ZESTRIL     Mens 50+ Multi Vitamin/Min Tabs     rosuvastatin 20 MG tablet  Commonly known as: CRESTOR               Where to Get Your Medications        These medications were sent to [facility] [place], [place] - [address] [facility] - P [phone] - F [phone]  [address] [place] [address]      Phone: [phone]   apixaban 5 MG Tabs tablet  HYDROcodone-acetaminophen 5-325 MG per tablet             DISPOSITION:    Home with Family:    X   Home with HH/PT/OT/RN:    SNF/LTC:    MEENU:    OTHER:            Code status:   Recommended diet: regular diet  Recommended activity: activity as tolerated  Wound care: None      Follow up with:   PCP : Eloisa Feldman MD  Preet, Carlos Vee MD  [address] [facility] [id number] [phone]    Schedule an appointment as soon as possible for a visit in 2 week(s)  Follow-up vascular surgeon in 2 weeks for evaluation of recent left leg DVT.    Eloisa Feldman MD  [address] [facility]  [place] [address] [phone]    Schedule an appointment as soon as possible for a visit in 1 week(s)  Follow-up with PCP in 10 to 14 days given recent hospitalization and continued monitoring and management of medications and comorbidities.    Aneudy Zaidi MD  [address] [facility] [id number] [phone]    Schedule an appointment as soon as possible for a visit in 1 month(s)  Follow up with hematologist above

## 2025-01-04 NOTE — PROGRESS NOTES
Hospitalist Progress Note    NAME:   Shaheed Hair   : 1955   MRN: [id number]     Date/Time: 1/3/2025 8:58 PM  Patient PCP: Eloisa Feldman MD    Estimated discharge date:   Barriers: Transition to oral anticoagulation, surgical clearance    Hospital Course:  Shaheed Hair 69-year-old male with history of hypertension, hyperlipidemia, and arthritis who is presented to the ED on  for pain and swelling in the left lower extremity x 2 days.  Reports recent STEMI immobilization in bed with flulike symptoms.  Denies chest pain or shortness of breath.  Left leg venous duplex ultrasound revealed extensive, acute occlusive DVT from proximal to distal femoral vein, popliteal vein, peroneal vein, and soleal vein.  CT abdomen pelvis with IV contrast showed no evidence for venous thrombosis in the IVC, iliac veins, or common femoral vein.  Initial lab work and vital signs otherwise unremarkable.  Initiated on IV heparin and given morphine for pain control.  Denies personal or family history of clotting disorders.  Denies symptoms suggestive of malignancy such as anorexia, weight loss, fatigue, chills or nighttime diaphoresis.  Prior history of colon polyps, but otherwise no history of cancer.  PSA pending.  Vascular surgery consulted.  Does not appear to have signs of phlegmasia.  Continue IV heparin with plan to transition to oral anticoagulation before discharge, keeping leg elevated with 3 pillows, and transitioning to MICHAEL hose.      Assessment / Plan:  Left sided acute occlusive DVT from proximal to distal femoral, popliteal, peroneal, and soleal veins  -Ultrasound confirmed extensive, acute occlusive DVT   -CT A/P with contrast showed no evidence for venous thrombosis in IVC, iliac veins, or common femoral vein  -Denies symptoms suggestive of malignancy, PSA pending  -Continue IV heparin, plan to transition to oral anticoagulation per vascular surgery  -Continue elevating left  leg    Hypertension  -Reports not taking home Lisinopril as he thinks it contributes to myalgias, restarted here  -Educated about side effects of Crestor and discussed importance of good blood pressure control  -Follow-up with PCP for continued management    Hyperlipidemia  -Previously prescribed Crestor, patient reports not taking as it causes myalgias  -Continue Crestor while admitted  -Follow-up with PCP for alternative options          Medical Decision Making     Chart reviewed and  High (any 2)     A. Problems (any 1)  [x] Acute/Chronic Illness/injury posing threat to life or bodily function:    [] Severe exacerbation of chronic illness:    ---------------------------------------------------------------------  B. Risk of Treatment (any 1)   [x] Drugs/treatments that require intensive monitoring for toxicity include:    [] IV ABX requiring serial renal monitoring for nephrotoxicity:     [] IV Narcotic analgesia for adverse drug reaction  [] Aggressive IV diuresis requiring serial monitoring for renal impairment and electrolyte derangements  [] Critical electrolyte abnormalities requiring IV replacement and close serial monitoring  [] Insulin - monitoring serial FSBS for Hypoglycemic adverse drug reaction  [x] Other -heparin  [] Change in code status:    [] Decision to escalate care:    [] Major surgery/procedure with associated risk factors:     ----------------------------------------------------------------------  C. Data (any 2)  [x] Discussed current management and discharge planning options with Case Management.  [x] Discussed management of the case with: Patient, RN  [] Telemetry personally reviewed and interpreted as documented above    [] Imaging personally reviewed and interpreted, includes:     [x] Data Review (any 3)  [x] All available Consultant notes from yesterday/today were reviewed  [x] All current labs were reviewed and interpreted for clinical significance   [x] Appropriate follow-up labs were

## 2025-01-04 NOTE — PROGRESS NOTES
PROGRESS NOTE      Chief Complaints:  No new complaints.  HPI and  Objective:    Pain is better.  Review of Systems:  Rest of review of system reviewed personally and they are negative.    EXAM:  /63   Pulse 50   Temp (!) 102 °F (38.9 °C) (Oral)   Resp 17   Ht 1.803 m (5' 11\")   Wt 102.1 kg (225 lb)   SpO2 97%   BMI 31.38 kg/m²     Patient is awake   Head and neck atraumatic, normocephalic.  ENT: No hoarse voice, gaze appropriate.  Cardiac system regular rate rhythm.  Pulmonary no audible wheeze, no cyanosis.  Chest wall excursion normal with respiration cycle  Abdomen is soft not particularly distended.  Neurologically nonfocal.  Hematology system: No bruising noted.  Musculoskeletal system: No joint deformity noted.  Genitourinary system: No hematuria noted.  Skin is warm and moist.  Psychosocial: Cooperative.  Vascular examination as previously noted no changes.    Current Facility-Administered Medications   Medication Dose Route Frequency Provider Last Rate Last Admin    heparin (porcine) injection 7,800 Units  80 Units/kg IntraVENous PRN Unruly Trejo MD        heparin (porcine) injection 3,900 Units  40 Units/kg IntraVENous PRN Unruly Trejo MD        heparin 25,000 units in dextrose 5% 250 mL (premix) infusion  5-30 Units/kg/hr IntraVENous Continuous Unruly Trejo MD 16.6 mL/hr at 01/03/25 1906 17 Units/kg/hr at 01/03/25 1906    lisinopril (PRINIVIL;ZESTRIL) tablet 5 mg  5 mg Oral Daily Lauri Jha MD   5 mg at 01/03/25 0932    rosuvastatin (CRESTOR) tablet 20 mg  20 mg Oral Nightly Lauri Jha MD   20 mg at 01/03/25 2212    sodium chloride flush 0.9 % injection 5-40 mL  5-40 mL IntraVENous 2 times per day Lauri Jha MD   10 mL at 01/03/25 2213    sodium chloride flush 0.9 % injection 5-40 mL  5-40 mL IntraVENous PRN Lauri Jha MD        0.9 % sodium chloride infusion   IntraVENous PRN Lauri Jha MD        potassium chloride (KLOR-CON

## 2025-01-23 ENCOUNTER — OFFICE VISIT (OUTPATIENT)
Age: 70
End: 2025-01-23
Payer: MEDICARE

## 2025-01-23 VITALS
HEIGHT: 71 IN | TEMPERATURE: 97.6 F | SYSTOLIC BLOOD PRESSURE: 120 MMHG | WEIGHT: 225 LBS | DIASTOLIC BLOOD PRESSURE: 64 MMHG | BODY MASS INDEX: 31.5 KG/M2 | OXYGEN SATURATION: 96 % | HEART RATE: 68 BPM

## 2025-01-23 DIAGNOSIS — I82.412 DVT OF DEEP FEMORAL VEIN, LEFT (HCC): Primary | ICD-10-CM

## 2025-01-23 PROCEDURE — 1123F ACP DISCUSS/DSCN MKR DOCD: CPT | Performed by: SURGERY

## 2025-01-23 PROCEDURE — G8417 CALC BMI ABV UP PARAM F/U: HCPCS | Performed by: SURGERY

## 2025-01-23 PROCEDURE — 3017F COLORECTAL CA SCREEN DOC REV: CPT | Performed by: SURGERY

## 2025-01-23 PROCEDURE — 1036F TOBACCO NON-USER: CPT | Performed by: SURGERY

## 2025-01-23 PROCEDURE — G8427 DOCREV CUR MEDS BY ELIG CLIN: HCPCS | Performed by: SURGERY

## 2025-01-23 PROCEDURE — 3074F SYST BP LT 130 MM HG: CPT | Performed by: SURGERY

## 2025-01-23 PROCEDURE — 3078F DIAST BP <80 MM HG: CPT | Performed by: SURGERY

## 2025-01-23 PROCEDURE — 99203 OFFICE O/P NEW LOW 30 MIN: CPT | Performed by: SURGERY

## 2025-01-23 ASSESSMENT — PATIENT HEALTH QUESTIONNAIRE - PHQ9
SUM OF ALL RESPONSES TO PHQ9 QUESTIONS 1 & 2: 2
2. FEELING DOWN, DEPRESSED OR HOPELESS: NOT AT ALL
SUM OF ALL RESPONSES TO PHQ QUESTIONS 1-9: 2
1. LITTLE INTEREST OR PLEASURE IN DOING THINGS: MORE THAN HALF THE DAYS

## 2025-01-30 NOTE — PROGRESS NOTES
Identified pt with two pt identifiers (name and ). Reviewed chart in preparation for visit and have obtained necessary documentation.    Shaheed Hair is a 69 y.o. male  Chief Complaint   Patient presents with    Follow-up     Hospital follow up left leg DVT     /64 (Site: Left Upper Arm, Position: Sitting, Cuff Size: Large Adult)   Pulse 68   Temp 97.6 °F (36.4 °C) (Temporal)   Ht 1.803 m (5' 11\")   Wt 102.1 kg (225 lb)   SpO2 96%   BMI 31.38 kg/m²     1. Have you been to the ER, urgent care clinic since your last visit?  Hospitalized since your last visit?no    2. Have you seen or consulted any other health care providers outside of the [facility] since your last visit?  Include any pap smears or colon screening. no   
NEW REFERENCE RANGE    Glucose 01/04/2025 111 (H)  65 - 100 mg/dL Final    BUN 01/04/2025 16  6 - 20 mg/dL Final    Creatinine 01/04/2025 1.06  0.70 - 1.30 mg/dL Final    BUN/Creatinine Ratio 01/04/2025 15  12 - 20   Final    Est, Glom Filt Rate 01/04/2025 75  >60 ml/min/1.73m2 Final    Comment:    Pediatric calculator link: https://www.kidney.org/professionals/kdoqi/gfr_calculatorped    These results are not intended for use in patients <18 years of age.     eGFR results are calculated without a race factor using  the 2021 CKD-EPI equation. Careful clinical correlation is recommended, particularly when comparing to results calculated using previous equations.  The CKD-EPI equation is less accurate in patients with extremes of muscle mass, extra-renal metabolism of creatinine, excessive creatine ingestion, or following therapy that affects renal tubular secretion.      Calcium 01/04/2025 9.1  8.5 - 10.1 mg/dL Final   Admission on 01/02/2025, Discharged on 01/02/2025   Component Date Value Ref Range Status    Body Surface Area 01/02/2025 2.21  m2 Final         Images:      Preet Gonzalez MD    Assessments:  Patient Active Problem List   Diagnosis    DVT of deep femoral vein, left (HCC)    Hypertension    Hyperlipidemia       Plans: Patient states edema has improved as well as the pain on the left leg.  Patient has extensive DVT involving femoral vein popliteal vein, peroneal vein and soleal vein.  I discussed with patient details about progression of the DVT after diagnosis.  I did recommend repeat venous ultrasound in 6 months.  If resolution of the DVT we will discontinue Eliquis.  Patient has known history of the etiology of her DVT as well without any family history.      **Note: This marcus is pertinent to patient with PAD.    Vascular risk factor modification regimen:  5 regimens include: Optimal cholesterol control, exercise program, medication modifications including antiplatelet therapy and antihypertensives,

## 2025-04-04 ENCOUNTER — HOSPITAL ENCOUNTER (EMERGENCY)
Facility: HOSPITAL | Age: 70
Discharge: HOME | End: 2025-04-04
Payer: MEDICARE

## 2025-04-04 ENCOUNTER — APPOINTMENT (OUTPATIENT)
Facility: HOSPITAL | Age: 70
End: 2025-04-04
Payer: MEDICARE

## 2025-04-04 VITALS
RESPIRATION RATE: 16 BRPM | BODY MASS INDEX: 28.99 KG/M2 | SYSTOLIC BLOOD PRESSURE: 123 MMHG | TEMPERATURE: 97.7 F | OXYGEN SATURATION: 98 % | HEART RATE: 81 BPM | WEIGHT: 214 LBS | DIASTOLIC BLOOD PRESSURE: 91 MMHG | HEIGHT: 72 IN

## 2025-04-04 DIAGNOSIS — M76.62 ACHILLES TENDINITIS, LEFT LEG: Primary | ICD-10-CM

## 2025-04-04 PROCEDURE — 73610 X-RAY EXAM OF ANKLE: CPT

## 2025-04-04 PROCEDURE — 99283 EMERGENCY DEPT VISIT LOW MDM: CPT

## 2025-04-04 ASSESSMENT — PAIN DESCRIPTION - ORIENTATION: ORIENTATION: LEFT;LOWER

## 2025-04-04 ASSESSMENT — PAIN DESCRIPTION - LOCATION: LOCATION: LEG

## 2025-04-04 ASSESSMENT — PAIN SCALES - GENERAL: PAINLEVEL_OUTOF10: 10

## 2025-04-04 ASSESSMENT — PAIN - FUNCTIONAL ASSESSMENT: PAIN_FUNCTIONAL_ASSESSMENT: 0-10

## 2025-04-04 NOTE — ED TRIAGE NOTES
Pt reports pain in L heel \"for a while.\" This occurrence for approx 2 days. Hurts worse when putting foot flat on the floor. Pt also reports he has blood clots in the same leg.

## 2025-05-04 ENCOUNTER — HOSPITAL ENCOUNTER (INPATIENT)
Facility: HOSPITAL | Age: 70
LOS: 3 days | Discharge: HOME | DRG: 281 | End: 2025-05-07
Attending: EMERGENCY MEDICINE
Payer: MEDICARE

## 2025-05-04 ENCOUNTER — APPOINTMENT (OUTPATIENT)
Facility: HOSPITAL | Age: 70
DRG: 281 | End: 2025-05-04
Payer: MEDICARE

## 2025-05-04 DIAGNOSIS — R07.9 CHEST PAIN, UNSPECIFIED TYPE: Primary | ICD-10-CM

## 2025-05-04 DIAGNOSIS — E87.6 HYPOKALEMIA: ICD-10-CM

## 2025-05-04 DIAGNOSIS — I21.4 NSTEMI (NON-ST ELEVATED MYOCARDIAL INFARCTION) (HCC): ICD-10-CM

## 2025-05-04 LAB
ALBUMIN SERPL-MCNC: 2.6 G/DL (ref 3.5–5)
ALBUMIN/GLOB SERPL: 1.1 (ref 1.1–2.2)
ALP SERPL-CCNC: 63 U/L (ref 45–117)
ALT SERPL-CCNC: 34 U/L (ref 12–78)
ANION GAP SERPL CALC-SCNC: 4 MMOL/L (ref 2–12)
ANION GAP SERPL CALC-SCNC: 6 MMOL/L (ref 2–12)
APTT PPP: 29.4 SEC (ref 21.2–34.1)
APTT PPP: 34 SEC (ref 21.2–34.1)
AST SERPL W P-5'-P-CCNC: 23 U/L (ref 15–37)
BASOPHILS # BLD: 0.07 K/UL (ref 0–0.1)
BASOPHILS NFR BLD: 0.8 % (ref 0–1)
BILIRUB SERPL-MCNC: 0.3 MG/DL (ref 0.2–1)
BNP SERPL-MCNC: 67 PG/ML
BUN SERPL-MCNC: 24 MG/DL (ref 6–20)
BUN SERPL-MCNC: 25 MG/DL (ref 6–20)
BUN/CREAT SERPL: 25 (ref 12–20)
BUN/CREAT SERPL: 38 (ref 12–20)
CA-I BLD-MCNC: 6.4 MG/DL (ref 8.5–10.1)
CA-I BLD-MCNC: 8.8 MG/DL (ref 8.5–10.1)
CHLORIDE SERPL-SCNC: 108 MMOL/L (ref 97–108)
CHLORIDE SERPL-SCNC: 119 MMOL/L (ref 97–108)
CO2 SERPL-SCNC: 23 MMOL/L (ref 21–32)
CO2 SERPL-SCNC: 26 MMOL/L (ref 21–32)
CREAT SERPL-MCNC: 0.65 MG/DL (ref 0.7–1.3)
CREAT SERPL-MCNC: 0.95 MG/DL (ref 0.7–1.3)
D DIMER PPP FEU-MCNC: <0.27 UG/ML(FEU)
DIFFERENTIAL METHOD BLD: NORMAL
EKG ATRIAL RATE: 69 BPM
EKG DIAGNOSIS: NORMAL
EKG P AXIS: 34 DEGREES
EKG P-R INTERVAL: 170 MS
EKG Q-T INTERVAL: 400 MS
EKG QRS DURATION: 102 MS
EKG QTC CALCULATION (BAZETT): 428 MS
EKG R AXIS: 32 DEGREES
EKG T AXIS: 50 DEGREES
EKG VENTRICULAR RATE: 69 BPM
EOSINOPHIL # BLD: 0.23 K/UL (ref 0–0.4)
EOSINOPHIL NFR BLD: 2.8 % (ref 0–7)
ERYTHROCYTE [DISTWIDTH] IN BLOOD BY AUTOMATED COUNT: 12.7 % (ref 11.5–14.5)
ERYTHROCYTE [DISTWIDTH] IN BLOOD BY AUTOMATED COUNT: 12.8 % (ref 11.5–14.5)
GLOBULIN SER CALC-MCNC: 2.3 G/DL (ref 2–4)
GLUCOSE SERPL-MCNC: 101 MG/DL (ref 65–100)
GLUCOSE SERPL-MCNC: 111 MG/DL (ref 65–100)
HCT VFR BLD AUTO: 41.5 % (ref 36.6–50.3)
HCT VFR BLD AUTO: 42.9 % (ref 36.6–50.3)
HGB BLD-MCNC: 14.6 G/DL (ref 12.1–17)
HGB BLD-MCNC: 15 G/DL (ref 12.1–17)
IMM GRANULOCYTES # BLD AUTO: 0.03 K/UL (ref 0–0.04)
IMM GRANULOCYTES NFR BLD AUTO: 0.4 % (ref 0–0.5)
INR PPP: 1 (ref 0.9–1.1)
LYMPHOCYTES # BLD: 1.99 K/UL (ref 0.8–3.5)
LYMPHOCYTES NFR BLD: 23.8 % (ref 12–49)
MAGNESIUM SERPL-MCNC: 1.4 MG/DL (ref 1.6–2.4)
MAGNESIUM SERPL-MCNC: 2.1 MG/DL (ref 1.6–2.4)
MCH RBC QN AUTO: 32.3 PG (ref 26–34)
MCH RBC QN AUTO: 32.7 PG (ref 26–34)
MCHC RBC AUTO-ENTMCNC: 35 G/DL (ref 30–36.5)
MCHC RBC AUTO-ENTMCNC: 35.2 G/DL (ref 30–36.5)
MCV RBC AUTO: 92.3 FL (ref 80–99)
MCV RBC AUTO: 92.8 FL (ref 80–99)
MONOCYTES # BLD: 0.8 K/UL (ref 0–1)
MONOCYTES NFR BLD: 9.6 % (ref 5–13)
NEUTS SEG # BLD: 5.23 K/UL (ref 1.8–8)
NEUTS SEG NFR BLD: 62.6 % (ref 32–75)
NRBC # BLD: 0 K/UL (ref 0–0.01)
NRBC # BLD: 0 K/UL (ref 0–0.01)
NRBC BLD-RTO: 0 PER 100 WBC
NRBC BLD-RTO: 0 PER 100 WBC
PLATELET # BLD AUTO: 123 K/UL (ref 150–400)
PLATELET # BLD AUTO: 150 K/UL (ref 150–400)
PMV BLD AUTO: 11.6 FL (ref 8.9–12.9)
PMV BLD AUTO: 11.7 FL (ref 8.9–12.9)
POTASSIUM SERPL-SCNC: 3 MMOL/L (ref 3.5–5.1)
POTASSIUM SERPL-SCNC: 4.2 MMOL/L (ref 3.5–5.1)
PROT SERPL-MCNC: 4.9 G/DL (ref 6.4–8.2)
PROTHROMBIN TIME: 13.9 SEC (ref 11.9–14.6)
RBC # BLD AUTO: 4.47 M/UL (ref 4.1–5.7)
RBC # BLD AUTO: 4.65 M/UL (ref 4.1–5.7)
SODIUM SERPL-SCNC: 140 MMOL/L (ref 136–145)
SODIUM SERPL-SCNC: 146 MMOL/L (ref 136–145)
THERAPEUTIC RANGE: NORMAL SEC (ref 82–109)
THERAPEUTIC RANGE: NORMAL SEC (ref 82–109)
TROPONIN I SERPL HS-MCNC: 1632 NG/L (ref 0–76)
TROPONIN I SERPL HS-MCNC: 1974 NG/L (ref 0–76)
TROPONIN I SERPL HS-MCNC: 893 NG/L (ref 0–76)
UFH PPP CHRO-ACNC: >1.1 IU/ML
UFH PPP CHRO-ACNC: >1.1 IU/ML
WBC # BLD AUTO: 8.4 K/UL (ref 4.1–11.1)
WBC # BLD AUTO: 9.2 K/UL (ref 4.1–11.1)

## 2025-05-04 PROCEDURE — 6360000002 HC RX W HCPCS: Performed by: EMERGENCY MEDICINE

## 2025-05-04 PROCEDURE — 2500000003 HC RX 250 WO HCPCS

## 2025-05-04 PROCEDURE — 71275 CT ANGIOGRAPHY CHEST: CPT

## 2025-05-04 PROCEDURE — 85379 FIBRIN DEGRADATION QUANT: CPT

## 2025-05-04 PROCEDURE — 6370000000 HC RX 637 (ALT 250 FOR IP)

## 2025-05-04 PROCEDURE — 36415 COLL VENOUS BLD VENIPUNCTURE: CPT

## 2025-05-04 PROCEDURE — 85520 HEPARIN ASSAY: CPT

## 2025-05-04 PROCEDURE — 83735 ASSAY OF MAGNESIUM: CPT

## 2025-05-04 PROCEDURE — 2060000000 HC ICU INTERMEDIATE R&B

## 2025-05-04 PROCEDURE — 96375 TX/PRO/DX INJ NEW DRUG ADDON: CPT

## 2025-05-04 PROCEDURE — 6370000000 HC RX 637 (ALT 250 FOR IP): Performed by: EMERGENCY MEDICINE

## 2025-05-04 PROCEDURE — 71045 X-RAY EXAM CHEST 1 VIEW: CPT

## 2025-05-04 PROCEDURE — 85027 COMPLETE CBC AUTOMATED: CPT

## 2025-05-04 PROCEDURE — 93005 ELECTROCARDIOGRAM TRACING: CPT

## 2025-05-04 PROCEDURE — 80053 COMPREHEN METABOLIC PANEL: CPT

## 2025-05-04 PROCEDURE — 83880 ASSAY OF NATRIURETIC PEPTIDE: CPT

## 2025-05-04 PROCEDURE — 84484 ASSAY OF TROPONIN QUANT: CPT

## 2025-05-04 PROCEDURE — 99291 CRITICAL CARE FIRST HOUR: CPT

## 2025-05-04 PROCEDURE — 93005 ELECTROCARDIOGRAM TRACING: CPT | Performed by: EMERGENCY MEDICINE

## 2025-05-04 PROCEDURE — 94761 N-INVAS EAR/PLS OXIMETRY MLT: CPT

## 2025-05-04 PROCEDURE — 6360000004 HC RX CONTRAST MEDICATION: Performed by: EMERGENCY MEDICINE

## 2025-05-04 PROCEDURE — 96374 THER/PROPH/DIAG INJ IV PUSH: CPT

## 2025-05-04 PROCEDURE — 85730 THROMBOPLASTIN TIME PARTIAL: CPT

## 2025-05-04 PROCEDURE — 85610 PROTHROMBIN TIME: CPT

## 2025-05-04 PROCEDURE — 80048 BASIC METABOLIC PNL TOTAL CA: CPT

## 2025-05-04 PROCEDURE — 85025 COMPLETE CBC W/AUTO DIFF WBC: CPT

## 2025-05-04 RX ORDER — POTASSIUM CHLORIDE 1500 MG/1
40 TABLET, EXTENDED RELEASE ORAL PRN
Status: DISCONTINUED | OUTPATIENT
Start: 2025-05-04 | End: 2025-05-07 | Stop reason: HOSPADM

## 2025-05-04 RX ORDER — ONDANSETRON 2 MG/ML
4 INJECTION INTRAMUSCULAR; INTRAVENOUS ONCE
Status: COMPLETED | OUTPATIENT
Start: 2025-05-04 | End: 2025-05-04

## 2025-05-04 RX ORDER — ACETAMINOPHEN 325 MG/1
650 TABLET ORAL EVERY 4 HOURS PRN
Status: DISCONTINUED | OUTPATIENT
Start: 2025-05-04 | End: 2025-05-07 | Stop reason: HOSPADM

## 2025-05-04 RX ORDER — MORPHINE SULFATE 4 MG/ML
4 INJECTION, SOLUTION INTRAMUSCULAR; INTRAVENOUS
Refills: 0 | Status: COMPLETED | OUTPATIENT
Start: 2025-05-04 | End: 2025-05-04

## 2025-05-04 RX ORDER — ATORVASTATIN CALCIUM 40 MG/1
40 TABLET, FILM COATED ORAL DAILY
Status: DISCONTINUED | OUTPATIENT
Start: 2025-05-04 | End: 2025-05-07 | Stop reason: HOSPADM

## 2025-05-04 RX ORDER — CALCIUM GLUCONATE 20 MG/ML
2000 INJECTION, SOLUTION INTRAVENOUS ONCE
Status: DISCONTINUED | OUTPATIENT
Start: 2025-05-04 | End: 2025-05-04

## 2025-05-04 RX ORDER — ASPIRIN 81 MG/1
81 TABLET, CHEWABLE ORAL DAILY
Status: DISCONTINUED | OUTPATIENT
Start: 2025-05-04 | End: 2025-05-07 | Stop reason: HOSPADM

## 2025-05-04 RX ORDER — CETIRIZINE HYDROCHLORIDE 10 MG/1
5 TABLET ORAL DAILY
Status: DISCONTINUED | OUTPATIENT
Start: 2025-05-04 | End: 2025-05-07 | Stop reason: HOSPADM

## 2025-05-04 RX ORDER — POTASSIUM CHLORIDE 7.45 MG/ML
10 INJECTION INTRAVENOUS PRN
Status: DISCONTINUED | OUTPATIENT
Start: 2025-05-04 | End: 2025-05-04

## 2025-05-04 RX ORDER — OXYCODONE HYDROCHLORIDE 5 MG/1
5 TABLET ORAL EVERY 4 HOURS PRN
Refills: 0 | Status: DISCONTINUED | OUTPATIENT
Start: 2025-05-04 | End: 2025-05-07 | Stop reason: HOSPADM

## 2025-05-04 RX ORDER — OXYCODONE HYDROCHLORIDE 10 MG/1
10 TABLET ORAL EVERY 4 HOURS PRN
Refills: 0 | Status: DISCONTINUED | OUTPATIENT
Start: 2025-05-04 | End: 2025-05-07 | Stop reason: HOSPADM

## 2025-05-04 RX ORDER — FAMOTIDINE 20 MG/1
20 TABLET, FILM COATED ORAL 2 TIMES DAILY
Status: DISCONTINUED | OUTPATIENT
Start: 2025-05-04 | End: 2025-05-07 | Stop reason: HOSPADM

## 2025-05-04 RX ORDER — MAGNESIUM SULFATE IN WATER 40 MG/ML
2000 INJECTION, SOLUTION INTRAVENOUS PRN
Status: DISCONTINUED | OUTPATIENT
Start: 2025-05-04 | End: 2025-05-07 | Stop reason: HOSPADM

## 2025-05-04 RX ORDER — POLYETHYLENE GLYCOL 3350 17 G/17G
17 POWDER, FOR SOLUTION ORAL DAILY PRN
Status: DISCONTINUED | OUTPATIENT
Start: 2025-05-04 | End: 2025-05-07 | Stop reason: HOSPADM

## 2025-05-04 RX ORDER — HEPARIN SODIUM 10000 [USP'U]/100ML
5-30 INJECTION, SOLUTION INTRAVENOUS CONTINUOUS
Status: DISCONTINUED | OUTPATIENT
Start: 2025-05-04 | End: 2025-05-06

## 2025-05-04 RX ORDER — ACETAMINOPHEN 650 MG/1
650 SUPPOSITORY RECTAL EVERY 6 HOURS PRN
Status: DISCONTINUED | OUTPATIENT
Start: 2025-05-04 | End: 2025-05-07 | Stop reason: HOSPADM

## 2025-05-04 RX ORDER — HEPARIN SODIUM 1000 [USP'U]/ML
2000 INJECTION, SOLUTION INTRAVENOUS; SUBCUTANEOUS PRN
Status: DISCONTINUED | OUTPATIENT
Start: 2025-05-04 | End: 2025-05-06

## 2025-05-04 RX ORDER — SODIUM CHLORIDE 0.9 % (FLUSH) 0.9 %
5-40 SYRINGE (ML) INJECTION EVERY 12 HOURS SCHEDULED
Status: DISCONTINUED | OUTPATIENT
Start: 2025-05-04 | End: 2025-05-07 | Stop reason: HOSPADM

## 2025-05-04 RX ORDER — IOPAMIDOL 755 MG/ML
100 INJECTION, SOLUTION INTRAVASCULAR
Status: COMPLETED | OUTPATIENT
Start: 2025-05-04 | End: 2025-05-04

## 2025-05-04 RX ORDER — ACETAMINOPHEN 325 MG/1
650 TABLET ORAL EVERY 6 HOURS PRN
Status: DISCONTINUED | OUTPATIENT
Start: 2025-05-04 | End: 2025-05-04 | Stop reason: SDUPTHER

## 2025-05-04 RX ORDER — CALCIUM CARBONATE 500 MG/1
500 TABLET, CHEWABLE ORAL
Status: COMPLETED | OUTPATIENT
Start: 2025-05-04 | End: 2025-05-04

## 2025-05-04 RX ORDER — HEPARIN SODIUM 1000 [USP'U]/ML
4000 INJECTION, SOLUTION INTRAVENOUS; SUBCUTANEOUS PRN
Status: DISCONTINUED | OUTPATIENT
Start: 2025-05-04 | End: 2025-05-06

## 2025-05-04 RX ORDER — MAGNESIUM SULFATE HEPTAHYDRATE 40 MG/ML
2000 INJECTION, SOLUTION INTRAVENOUS ONCE
Status: COMPLETED | OUTPATIENT
Start: 2025-05-04 | End: 2025-05-04

## 2025-05-04 RX ORDER — HEPARIN SODIUM 1000 [USP'U]/ML
4000 INJECTION, SOLUTION INTRAVENOUS; SUBCUTANEOUS ONCE
Status: DISCONTINUED | OUTPATIENT
Start: 2025-05-04 | End: 2025-05-04 | Stop reason: ALTCHOICE

## 2025-05-04 RX ORDER — SODIUM CHLORIDE 0.9 % (FLUSH) 0.9 %
5-40 SYRINGE (ML) INJECTION PRN
Status: DISCONTINUED | OUTPATIENT
Start: 2025-05-04 | End: 2025-05-07 | Stop reason: HOSPADM

## 2025-05-04 RX ORDER — POTASSIUM CHLORIDE 7.45 MG/ML
10 INJECTION INTRAVENOUS PRN
Status: DISCONTINUED | OUTPATIENT
Start: 2025-05-04 | End: 2025-05-07 | Stop reason: HOSPADM

## 2025-05-04 RX ORDER — SODIUM CHLORIDE 9 MG/ML
INJECTION, SOLUTION INTRAVENOUS PRN
Status: DISCONTINUED | OUTPATIENT
Start: 2025-05-04 | End: 2025-05-07 | Stop reason: HOSPADM

## 2025-05-04 RX ADMIN — SODIUM CHLORIDE, PRESERVATIVE FREE 10 ML: 5 INJECTION INTRAVENOUS at 20:11

## 2025-05-04 RX ADMIN — ASPIRIN 81 MG 81 MG: 81 TABLET ORAL at 12:21

## 2025-05-04 RX ADMIN — IOPAMIDOL 100 ML: 755 INJECTION, SOLUTION INTRAVENOUS at 08:06

## 2025-05-04 RX ADMIN — ATORVASTATIN CALCIUM 40 MG: 40 TABLET, FILM COATED ORAL at 12:20

## 2025-05-04 RX ADMIN — MORPHINE SULFATE 4 MG: 4 INJECTION INTRAVENOUS at 07:25

## 2025-05-04 RX ADMIN — OXYCODONE 5 MG: 5 TABLET ORAL at 12:31

## 2025-05-04 RX ADMIN — SODIUM CHLORIDE, PRESERVATIVE FREE 10 ML: 5 INJECTION INTRAVENOUS at 12:28

## 2025-05-04 RX ADMIN — POTASSIUM BICARBONATE 40 MEQ: 782 TABLET, EFFERVESCENT ORAL at 12:22

## 2025-05-04 RX ADMIN — HEPARIN SODIUM AND DEXTROSE 10 UNITS/KG/HR: 10000; 5 INJECTION INTRAVENOUS at 20:13

## 2025-05-04 RX ADMIN — CALCIUM CARBONATE 500 MG: 500 TABLET, CHEWABLE ORAL at 08:16

## 2025-05-04 RX ADMIN — FAMOTIDINE 20 MG: 20 TABLET, FILM COATED ORAL at 12:21

## 2025-05-04 RX ADMIN — ONDANSETRON 4 MG: 2 INJECTION, SOLUTION INTRAMUSCULAR; INTRAVENOUS at 07:28

## 2025-05-04 RX ADMIN — FAMOTIDINE 20 MG: 20 TABLET, FILM COATED ORAL at 20:12

## 2025-05-04 RX ADMIN — MAGNESIUM SULFATE HEPTAHYDRATE 2000 MG: 40 INJECTION, SOLUTION INTRAVENOUS at 11:36

## 2025-05-04 RX ADMIN — CETIRIZINE HYDROCHLORIDE 5 MG: 10 TABLET, FILM COATED ORAL at 12:20

## 2025-05-04 ASSESSMENT — PAIN DESCRIPTION - ORIENTATION: ORIENTATION: LEFT

## 2025-05-04 ASSESSMENT — PAIN DESCRIPTION - LOCATION
LOCATION: ARM
LOCATION: CHEST
LOCATION: CHEST

## 2025-05-04 ASSESSMENT — PAIN SCALES - GENERAL
PAINLEVEL_OUTOF10: 6
PAINLEVEL_OUTOF10: 8
PAINLEVEL_OUTOF10: 0
PAINLEVEL_OUTOF10: 8
PAINLEVEL_OUTOF10: 10

## 2025-05-04 ASSESSMENT — PAIN DESCRIPTION - DESCRIPTORS: DESCRIPTORS: ACHING

## 2025-05-04 ASSESSMENT — PAIN - FUNCTIONAL ASSESSMENT: PAIN_FUNCTIONAL_ASSESSMENT: 0-10

## 2025-05-04 NOTE — PROGRESS NOTES
Received Order for Telemetry     Shaheed STACY Reginaldo   1955   [id number]   Hypokalemia [E87.6]  NSTEMI (non-ST elevated myocardial infarction) (HCC) [I21.4]  Chest pain, unspecified type [R07.9]   Jovana Canales MD     Tele Box # [id number] placed on patient at  0919 am  ER Room # 7  Admitting to Room 461  Transferring Nurse marianela  Verified with Primary Nurse rahul at  1200 pm

## 2025-05-04 NOTE — ED PROVIDER NOTES
[facility] EMERGENCY DEPT  EMERGENCY DEPARTMENT HISTORY AND PHYSICAL EXAM      Date of evaluation: 5/4/2025  Patient Name: Shaheed Hair  Birthdate 1955  MRN: [id number]  ED Provider: Bridgette Dumas MD   Note Started: 7:42 AM EDT 5/4/25    HISTORY OF PRESENT ILLNESS     Chief Complaint   Patient presents with    Chest Pain       History Provided By: Patient, only     HPI: Shaheed Hair is a 69 y.o. male patient with no known coronary disease notes he just recently saw his cardiologist with a negative workup.  Patient has had known DVTs bilateral lower extremities on Eliquis x 4 months.  Patient started with chest pain left anterior chest without radiation at 3 AM.  Symptoms persisted until now.  Possibly a slight difficulty taking a deep breath but very minimal.  Patient has had congestion no coughing no fevers.    PAST MEDICAL HISTORY   Past Medical History:  Past Medical History:   Diagnosis Date    Arthritis     Blood clot in vein     Hyperlipidemia     Hypertension        Past Surgical History:  Past Surgical History:   Procedure Laterality Date    COLONOSCOPY N/A 8/18/2023    COLONOSCOPY performed by Carole Sherwood MD at [facility] ENDOSCOPY    COLONOSCOPY N/A 8/18/2023    COLONOSCOPY WITH BIOPSY performed by Carole Sherwood MD at [facility] ENDOSCOPY       Family History:  History reviewed. No pertinent family history.    Social History:  Social History     Tobacco Use    Smoking status: Never    Smokeless tobacco: Never   Vaping Use    Vaping status: Never Used   Substance Use Topics    Alcohol use: Not Currently    Drug use: Yes     Types: Marijuana (Weed)     Comment: to sleep at night       Allergies:  No Known Allergies    PCP: Eloisa Feldman MD    Current Meds:   Current Facility-Administered Medications   Medication Dose Route Frequency Provider Last Rate Last Admin    heparin (porcine) injection 4,000 Units  4,000 Units IntraVENous Once Bridgette Dumas MD        heparin (porcine) injection 4,000 Units  4,000 Units  ER physician as NORMAL   [HP]   0834 D-Dimer, Quant: <0.27  normal [HP]   0834 Potassium(!): 3.0  Lab interpreted independently by ER physician as abnormal.  Hypokalemia: Will order Kcl 40 meq po x1   [HP]   0840 CTA CHEST W WO CONTRAST PE Eval  FINDINGS:  This is a good quality study for the evaluation of pulmonary embolism to the  first subsegmental arterial level. There is no pulmonary embolism to this level.     There is no apparent right heart strain.   There is no apparent aortic dissection or aneurysm.       Lungs show no acute finding. There is no pneumothorax. There is no pleural  effusion. There is no significant adenopathy.     IMPRESSION:  1. No pulmonary emboli.  2. No acute finding.        Electronically signed by YOGESH APARICIO   [HP]   0840 CT is negative acute for pulmonary emboli.  Will admit for NSTEMI.  Troponin #2 in process.  Will initiate heparin drip.  Cardiology consult. [HP]   0842 Dr will JR cards consulted. [HP]   0843 Dr restrepo admit [HP]      ED Course User Index  [HP] Bridgette Dumas MD       Clinical Management Tools:  Not Applicable    Smoking Cessation: Not Applicable    Patient was given the following medications:  Medications   heparin (porcine) injection 4,000 Units (has no administration in time range)   heparin (porcine) injection 4,000 Units (has no administration in time range)   heparin (porcine) injection 2,000 Units (has no administration in time range)   heparin 25,000 units in dextrose 5% 250 mL (premix) infusion (has no administration in time range)   ondansetron (ZOFRAN) injection 4 mg (4 mg IntraVENous Given 5/4/25 0728)   morphine (PF) injection 4 mg (4 mg IntraVENous Given 5/4/25 0725)   calcium carbonate (TUMS) chewable tablet 500 mg (500 mg Oral Given 5/4/25 0816)   iopamidol (ISOVUE-370) 76 % injection 100 mL (100 mLs IntraVENous Given 5/4/25 0806)       CONSULTS: See ED Course/MDM for further details.  IP CONSULT TO CARDIOLOGY   PROCEDURES   Unless

## 2025-05-04 NOTE — ED NOTES
ED TO INPATIENT SBAR HANDOFF    Patient Name: Shaheed Hair   Preferred Name: Shaheed  : 1955  69 y.o.   Family/Caregiver Present: no   Code Status Order: Prior  PO Status: NPO:no diet orders in yet  Telemetry Order: Yes  C-SSRS: Risk of Suicide: No Risk  Sitter no   Restraints:     Sepsis Risk Score Sepsis V2 Risk Score: 15.1    Situation  Chief Complaint   Patient presents with    Chest Pain     Brief Description of Patient's Condition: chest pain, NSTEMI, hypokalemia  Mental Status: oriented and alert  Arrived from:Home  Imaging:   CTA CHEST W WO CONTRAST PE Eval   Final Result   1. No pulmonary emboli.   2. No acute finding.         Electronically signed by YOGESH APARICIO      XR CHEST 1 VIEW   Final Result   No evidence of acute cardiopulmonary process.         Electronically signed by Dong Agee        Abnormal labs:   Abnormal Labs Reviewed   TROPONIN - Abnormal; Notable for the following components:       Result Value    Troponin, High Sensitivity 893 (*)     All other components within normal limits   COMPREHENSIVE METABOLIC PANEL - Abnormal; Notable for the following components:    Sodium 146 (*)     Potassium 3.0 (*)     Chloride 119 (*)     Glucose 101 (*)     BUN 25 (*)     Creatinine 0.65 (*)     BUN/Creatinine Ratio 38 (*)     Calcium 6.4 (*)     Total Protein 4.9 (*)     Albumin 2.6 (*)     All other components within normal limits       Background  Allergies: No Known Allergies  History:   Past Medical History:   Diagnosis Date    Arthritis     Blood clot in vein     Hyperlipidemia     Hypertension        Assessment  Vitals: MEWS Score: 0  Level of Consciousness: Alert (0)   Vitals:    25 0700 25 0715 25 0730 25 0835   BP: 106/72 138/71 122/69    Pulse: 57 66 73 58   Resp: 12 14 14 12   Temp:       TempSrc:       SpO2: 98% 97% 98% 98%   Weight:       Height:         Deterioration Index (DI):    Deterioration Index (DI) Interventions Performed:    O2 Flow Rate:    O2

## 2025-05-04 NOTE — CARE COORDINATION
05/04/25 1334   Service Assessment   Patient Orientation Alert and Oriented   Cognition Alert   History Provided By Patient   Primary Caregiver Self   Support Systems Children   Patient's Healthcare Decision Maker is: Legal Next of Kin   PCP Verified by CM Yes  (Dr. Feldman)   Last Visit to PCP Within last two years   Prior Functional Level Independent in ADLs/IADLs   Current Functional Level Independent in ADLs/IADLs   Can patient return to prior living arrangement Yes   Ability to make needs known: Good   Family able to assist with home care needs: Yes   Would you like for me to discuss the discharge plan with any other family members/significant others, and if so, who? Yes  (daughter)   Financial Resources Medicare   Community Resources None   Social/Functional History   Lives With Daughter   Type of Home House   Home Layout One level   Home Access Stairs to enter with rails   Entrance Stairs - Number of Steps a few   Entrance Stairs - Rails Both   Bathroom Shower/Tub None   Bathroom Toilet Standard   Bathroom Equipment None   Bathroom Accessibility Not accessible   Home Equipment None   Receives Help From Family   Prior Level of Assist for ADLs Independent   Prior Level of Assist for Homemaking Independent   Ambulation Assistance Independent   Prior Level of Assist for Transfers Independent   Active  Yes   Mode of Transportation Car   Discharge Planning   Type of Residence House   Living Arrangements Children   Current Services Prior To Admission None   Potential Assistance Needed N/A   DME Ordered? No   Potential Assistance Purchasing Medications No   Type of Home Care Services None   Patient expects to be discharged to: House   Services At/After Discharge   Transition of Care Consult (CM Consult) N/A   Services At/After Discharge None     Patient receives medication from [facility] in [place].    Advance Care Planning     General Advance Care Planning (ACP) Conversation    Date of Conversation:

## 2025-05-04 NOTE — ED NOTES
Corinne, pt's wife ( [phone]) called for status update and would like to be notified again once plan of treatment has been determined.

## 2025-05-04 NOTE — H&P
(L) 6.4 - 8.2 g/dL    Albumin 2.6 (L) 3.5 - 5.0 g/dL    Globulin 2.3 2.0 - 4.0 g/dL    Albumin/Globulin Ratio 1.1 1.1 - 2.2     D-Dimer, Quantitative    Collection Time: 05/04/25  6:40 AM   Result Value Ref Range    D-Dimer, Quant <0.27 <0.50 ug/ml(FEU)   Brain Natriuretic Peptide    Collection Time: 05/04/25  6:40 AM   Result Value Ref Range    NT Pro-BNP 67 <125 pg/mL   Magnesium    Collection Time: 05/04/25  6:40 AM   Result Value Ref Range    Magnesium 1.4 (L) 1.6 - 2.4 mg/dL   Troponin    Collection Time: 05/04/25  8:20 AM   Result Value Ref Range    Troponin, High Sensitivity 1,632 (HH) 0 - 76 ng/L   CBC    Collection Time: 05/04/25  9:04 AM   Result Value Ref Range    WBC 9.2 4.1 - 11.1 K/uL    RBC 4.47 4.10 - 5.70 M/uL    Hemoglobin 14.6 12.1 - 17.0 g/dL    Hematocrit 41.5 36.6 - 50.3 %    MCV 92.8 80.0 - 99.0 FL    MCH 32.7 26.0 - 34.0 PG    MCHC 35.2 30.0 - 36.5 g/dL    RDW 12.7 11.5 - 14.5 %    Platelets 123 (L) 150 - 400 K/uL    MPV 11.6 8.9 - 12.9 FL    Nucleated RBCs 0.0 0.0  WBC    nRBC 0.00 0.00 - 0.01 K/uL         CTA CHEST W WO CONTRAST PE Eval  Result Date: 5/4/2025  INDICATION:  Chest pain, unspecified /evaluate for PE EXAM: CT Angio Chest: TECHNIQUE: Unenhanced localizing CT imaging of the pulmonary arteries is followed by bolus injection of 100 mL Isovue 370 contrast IV, with thin section axial Chest CT obtained and 3D image post processing performed including coronal MIPS. CT dose reduction was achieved through use of a standardized protocol tailored for this examination and automatic exposure control for dose modulation. FINDINGS: This is a good quality study for the evaluation of pulmonary embolism to the first subsegmental arterial level. There is no pulmonary embolism to this level. There is no apparent right heart strain. There is no apparent aortic dissection or aneurysm.  Lungs show no acute finding. There is no pneumothorax. There is no pleural effusion. There is no significant  adenopathy.     1. No pulmonary emboli. 2. No acute finding. Electronically signed by YOGESH APARICIO    XR CHEST 1 VIEW  Result Date: 5/4/2025  INDICATION: Chest pain COMPARISON: 7/20/2020 FINDINGS: AP portable imaging of the chest performed at 6:54 a.m. demonstrates a stable cardiomediastinal silhouette. The lungs are clear bilaterally. No significant osseous abnormalities are seen.     No evidence of acute cardiopulmonary process. Electronically signed by Dong Agee    XR ANKLE LEFT (MIN 3 VIEWS)  Result Date: 4/4/2025  EXAM: XR ANKLE LEFT (MIN 3 VIEWS) INDICATION: Swelling posterior aspect. COMPARISON: None. FINDINGS: Three views of the left ankle demonstrate no fracture or disruption of the ankle mortise.  There is no other acute osseous or articular abnormality. Soft tissue swelling is mild at the posterior aspect of the calcaneus where there is cortical irregularity and hypertrophic spurring near the insertion of the Achilles tendon. Plantar calcaneal spur formation also noted.. Vascular calcification.     No acute Bony abnormality. There is, however, considerable cortical irregularity at the insertion of the Achilles tendon, where focal soft tissue swelling is noted. Electronically signed by BREANA RODRIGUEZ     _______________________________________________________________________    TOTAL TIME:  76 Minutes    Critical Care Provided     Minutes non procedure based    Signed: Jovana Canales MD    Procedures: see electronic medical records for all procedures/Xrays and details which were not copied into this note but were reviewed prior to creation of Plan.

## 2025-05-04 NOTE — CONSULTS
Cardiology Consult    NAME: Shaheed Hair   :  1955   MRN:  [id number]     Date/Time:  2025 11:34 AM    Patient PCP: Eloisa Feldman MD  ________________________________________________________________________     Assessment/Plan:   NSTEMI, chest pain started around 2:00 this morning when patient was walking his dog.  Retrosternal.  Tightness.  Subsequently it got better.  He did go to sleep and it woke him up again he was burping a lot.  Came to the emergency room.  Found with troponin leak and admitted.    DVT: History of fall in January, was bedridden after that and developed DVT, continues Eliquis.  Did see hematology.    Hypertension, well-controlled    Hypercholesterolemia, on statin    Prediabetes, says he was taken off prediabetic status with dietary changes.    Tobacco use in high school    Dad with aortic and brain aneurysm as cause of death, Mom  with emphysema               []        High complexity decision making was performed        Subjective:   CHIEF COMPLAINT:   Chest pain and burping    REASON FOR CONSULT:    NSTEMI  HISTORY OF PRESENT ILLNESS:     Shaheed Hair is a 69 y.o. White (non-) male who presents with complaints of chest pain.  Started 2:00 in the morning while walking his dog.  Subsequently went to sleep and woke up again with more pain and burping and with this he did come to the emergency room.  Found with rising troponin and admitted.  Currently pain-free.  Cardiac cath discussed.  Will schedule for Tuesday, last dose of Eliquis was this morning.        Past Medical History:   Diagnosis Date    Arthritis     Blood clot in vein     Hyperlipidemia     Hypertension       Past Surgical History:   Procedure Laterality Date    COLONOSCOPY N/A 2023    COLONOSCOPY performed by Carole Sherwood MD at [facility] ENDOSCOPY    COLONOSCOPY N/A 2023    COLONOSCOPY WITH BIOPSY performed by Carole Sherwood MD at [facility] ENDOSCOPY     No Known Allergies   Meds:  See below  Social History

## 2025-05-04 NOTE — PLAN OF CARE
Problem: Discharge Planning  Goal: Discharge to home or other facility with appropriate resources  Outcome: Progressing  Flowsheets (Taken 5/4/2025 0930)  Discharge to home or other facility with appropriate resources:   Identify barriers to discharge with patient and caregiver   Arrange for needed discharge resources and transportation as appropriate   Identify discharge learning needs (meds, wound care, etc)   Refer to discharge planning if patient needs post-hospital services based on physician order or complex needs related to functional status, cognitive ability or social support system     Problem: Pain  Goal: Verbalizes/displays adequate comfort level or baseline comfort level  Outcome: Progressing  Flowsheets (Taken 5/4/2025 0930)  Verbalizes/displays adequate comfort level or baseline comfort level:   Encourage patient to monitor pain and request assistance   Assess pain using appropriate pain scale   Implement non-pharmacological measures as appropriate and evaluate response   Administer analgesics based on type and severity of pain and evaluate response   Consider cultural and social influences on pain and pain management   Notify Licensed Independent Practitioner if interventions unsuccessful or patient reports new pain

## 2025-05-04 NOTE — ED TRIAGE NOTES
Patient states he has chest pain since 0300, has been burping since then as well. He also states he has blood clots \"all over his legs.\"

## 2025-05-04 NOTE — PROGRESS NOTES
Heparin Infusion Initiation  Shaheed Hair is a 69 y.o. male starting heparin for:  MI  Heparin dosing: order for weight based protocol  Initial Dosing Weight: 97.5 kg (Recorded body weight)  Recent Labs     05/04/25  0640      HGB 15.0     Factor Xa inhibitor/LMWH use within the past 72 hours? Yes  If yes, date and time of last administration:  5/4 ~ 0800  Hypertriglyceridemia (> 690 mg/dL) or hyperbilirubinemia (> 37 mg/dL conjugated bilirubin, >14 mg/dL unconjugated bilirubin) present? No  Recent Labs     05/04/25  0640   BILITOT 0.3       Assessment/Plan:   Heparin to be monitored using aPTT until 72 hours following last factor Xa inhibitor/LMWH administration, anticipated date for change to anti-Xa monitoring is 5/7 ~ 0800  Initial bolus ordered: Yes - however since received Apixaban this morning ~ 0800 should start heparin drip to start tonight ~ 2000 when the next Apixaban dose would be due - Dr. Canales agreed to D/C bolus and retime drip to start today @ 2000  Starting rate:  10 unit/kg/hr  PRN boluses entered: Yes

## 2025-05-04 NOTE — PROGRESS NOTES
4 Eyes Skin Assessment     NAME:  Shaheed Hair  YOB: 1955  MEDICAL RECORD NUMBER:  [id number]    The patient is being assessed for  Admission    I agree that at least one RN has performed a thorough Head to Toe Skin Assessment on the patient. ALL assessment sites listed below have been assessed.      Areas assessed by both nurses:    Head, Face, Ears, Shoulders, Back, Chest, Arms, Elbows, Hands, Sacrum. Buttock, Coccyx, Ischium, Legs. Feet and Heels, and Under Medical Devices         Does the Patient have a Wound? No noted wound(s)       Jorge Prevention initiated by RN: No  Wound Care Orders initiated by RN: No    Pressure Injury (Stage 3,4, Unstageable, DTI, NWPT, and Complex wounds) if present, place Wound referral order by RN under : No    New Ostomies, if present place, Ostomy referral order under : No     Nurse 1 eSignature: Electronically signed by Mitra Stover RN on 5/4/25 at 2:59 PM EDT    **SHARE this note so that the co-signing nurse can place an eSignature**    Nurse 2 eSignature: Electronically signed by Maria Orozco RN on 5/4/25 at 3:00 PM EDT

## 2025-05-04 NOTE — ED NOTES
Pt took his elequis this morning. Pharmacy is verifying with provider on whether or not heparin bolus is appropriate. Accepting nurse, MATILDE Alvarez, notified as well.

## 2025-05-05 ENCOUNTER — APPOINTMENT (OUTPATIENT)
Facility: HOSPITAL | Age: 70
DRG: 281 | End: 2025-05-05
Payer: MEDICARE

## 2025-05-05 LAB
ANION GAP SERPL CALC-SCNC: 2 MMOL/L (ref 2–12)
APTT PPP: 53 SEC (ref 21.2–34.1)
APTT PPP: 79.7 SEC (ref 21.2–34.1)
APTT PPP: 98.6 SEC (ref 21.2–34.1)
BUN SERPL-MCNC: 18 MG/DL (ref 6–20)
BUN/CREAT SERPL: 17 (ref 12–20)
CA-I BLD-MCNC: 8.7 MG/DL (ref 8.5–10.1)
CHLORIDE SERPL-SCNC: 108 MMOL/L (ref 97–108)
CHOLEST SERPL-MCNC: 105 MG/DL
CO2 SERPL-SCNC: 29 MMOL/L (ref 21–32)
CREAT SERPL-MCNC: 1.05 MG/DL (ref 0.7–1.3)
ECHO AO ASC DIAM: 3.8 CM
ECHO AO ASCENDING AORTA INDEX: 1.75 CM/M2
ECHO AO ROOT DIAM: 3.6 CM
ECHO AO ROOT INDEX: 1.66 CM/M2
ECHO AV AREA PEAK VELOCITY: 3.2 CM2
ECHO AV AREA VTI: 2.7 CM2
ECHO AV AREA/BSA PEAK VELOCITY: 1.5 CM2/M2
ECHO AV AREA/BSA VTI: 1.2 CM2/M2
ECHO AV MEAN GRADIENT: 4 MMHG
ECHO AV MEAN VELOCITY: 0.9 M/S
ECHO AV PEAK GRADIENT: 7 MMHG
ECHO AV PEAK VELOCITY: 1.3 M/S
ECHO AV VELOCITY RATIO: 0.69
ECHO AV VTI: 37.1 CM
ECHO BSA: 2.21 M2
ECHO EST RA PRESSURE: 3 MMHG
ECHO LA AREA 2C: 9.4 CM2
ECHO LA AREA 4C: 17.3 CM2
ECHO LA DIAMETER INDEX: 1.89 CM/M2
ECHO LA DIAMETER: 4.1 CM
ECHO LA MAJOR AXIS: 4.3 CM
ECHO LA MINOR AXIS: 4.1 CM
ECHO LA TO AORTIC ROOT RATIO: 1.14
ECHO LA VOL BP: 30 ML (ref 18–58)
ECHO LA VOL MOD A2C: 18 ML (ref 18–58)
ECHO LA VOL MOD A4C: 53 ML (ref 18–58)
ECHO LA VOL/BSA BIPLANE: 14 ML/M2 (ref 16–34)
ECHO LA VOLUME INDEX MOD A2C: 8 ML/M2 (ref 16–34)
ECHO LA VOLUME INDEX MOD A4C: 24 ML/M2 (ref 16–34)
ECHO LV E' LATERAL VELOCITY: 11.8 CM/S
ECHO LV E' SEPTAL VELOCITY: 6.26 CM/S
ECHO LV EDV 3D: 152 ML
ECHO LV EDV A2C: 77 ML
ECHO LV EDV INDEX 3D: 70 ML/M2
ECHO LV EDV NDEX A2C: 35 ML/M2
ECHO LV EJECTION FRACTION 3D: 51 %
ECHO LV EJECTION FRACTION A2C: 68 %
ECHO LV ESV 3D: 75 ML
ECHO LV ESV A2C: 25 ML
ECHO LV ESV A4C: 45 ML
ECHO LV ESV INDEX 3D: 35 ML/M2
ECHO LV ESV INDEX A2C: 12 ML/M2
ECHO LV ESV INDEX A4C: 21 ML/M2
ECHO LV FRACTIONAL SHORTENING: 10 % (ref 28–44)
ECHO LV INTERNAL DIMENSION DIASTOLE INDEX: 2.35 CM/M2
ECHO LV INTERNAL DIMENSION DIASTOLIC: 5.1 CM (ref 4.2–5.9)
ECHO LV INTERNAL DIMENSION SYSTOLIC INDEX: 2.12 CM/M2
ECHO LV INTERNAL DIMENSION SYSTOLIC: 4.6 CM
ECHO LV IVSD: 1.4 CM (ref 0.6–1)
ECHO LV MASS 2D: 285.1 G (ref 88–224)
ECHO LV MASS 3D INDEX: 83.4 G/M2
ECHO LV MASS 3D: 181 G
ECHO LV MASS INDEX 2D: 131.4 G/M2 (ref 49–115)
ECHO LV POSTERIOR WALL DIASTOLIC: 1.3 CM (ref 0.6–1)
ECHO LV RELATIVE WALL THICKNESS RATIO: 0.51
ECHO LVOT AREA: 4.5 CM2
ECHO LVOT AV VTI INDEX: 0.6
ECHO LVOT DIAM: 2.4 CM
ECHO LVOT MEAN GRADIENT: 2 MMHG
ECHO LVOT PEAK GRADIENT: 4 MMHG
ECHO LVOT PEAK VELOCITY: 0.9 M/S
ECHO LVOT STROKE VOLUME INDEX: 46.5 ML/M2
ECHO LVOT SV: 100.8 ML
ECHO LVOT VTI: 22.3 CM
ECHO MV A VELOCITY: 0.41 M/S
ECHO MV AREA VTI: 3.2 CM2
ECHO MV E DECELERATION TIME (DT): 225 MS
ECHO MV E VELOCITY: 0.73 M/S
ECHO MV E/A RATIO: 1.78
ECHO MV E/E' LATERAL: 6.19
ECHO MV E/E' RATIO (AVERAGED): 8.92
ECHO MV E/E' SEPTAL: 11.66
ECHO MV LVOT VTI INDEX: 1.41
ECHO MV MAX VELOCITY: 0.8 M/S
ECHO MV MEAN GRADIENT: 1 MMHG
ECHO MV MEAN VELOCITY: 0.5 M/S
ECHO MV PEAK GRADIENT: 3 MMHG
ECHO MV REGURGITANT PEAK GRADIENT: 85 MMHG
ECHO MV REGURGITANT PEAK VELOCITY: 4.6 M/S
ECHO MV REGURGITANT VTIA: 164 CM
ECHO MV VTI: 31.4 CM
ECHO PV MAX VELOCITY: 0.8 M/S
ECHO PV MEAN GRADIENT: 2 MMHG
ECHO PV MEAN VELOCITY: 0.6 M/S
ECHO PV PEAK GRADIENT: 3 MMHG
ECHO PV VTI: 21.3 CM
ECHO RA AREA 4C: 11.3 CM2
ECHO RA END SYSTOLIC VOLUME APICAL 4 CHAMBER INDEX BSA: 13 ML/M2
ECHO RA VOLUME: 28 ML
ECHO RIGHT VENTRICULAR SYSTOLIC PRESSURE (RVSP): 19 MMHG
ECHO RV FREE WALL PEAK S': 13.5 CM/S
ECHO RV MID DIMENSION: 3.1 CM
ECHO RV TAPSE: 3.2 CM (ref 1.7–?)
ECHO TV REGURGITANT MAX VELOCITY: 2 M/S
ECHO TV REGURGITANT PEAK GRADIENT: 16 MMHG
EKG ATRIAL RATE: 46 BPM
EKG ATRIAL RATE: 53 BPM
EKG DIAGNOSIS: NORMAL
EKG DIAGNOSIS: NORMAL
EKG P AXIS: -23 DEGREES
EKG P AXIS: 43 DEGREES
EKG P-R INTERVAL: 162 MS
EKG P-R INTERVAL: 170 MS
EKG Q-T INTERVAL: 452 MS
EKG Q-T INTERVAL: 506 MS
EKG QRS DURATION: 102 MS
EKG QRS DURATION: 98 MS
EKG QTC CALCULATION (BAZETT): 424 MS
EKG QTC CALCULATION (BAZETT): 442 MS
EKG R AXIS: 29 DEGREES
EKG R AXIS: 5 DEGREES
EKG T AXIS: 153 DEGREES
EKG T AXIS: 65 DEGREES
EKG VENTRICULAR RATE: 46 BPM
EKG VENTRICULAR RATE: 53 BPM
ERYTHROCYTE [DISTWIDTH] IN BLOOD BY AUTOMATED COUNT: 12.9 % (ref 11.5–14.5)
GLUCOSE SERPL-MCNC: 102 MG/DL (ref 65–100)
HCT VFR BLD AUTO: 43.9 % (ref 36.6–50.3)
HDLC SERPL-MCNC: 35 MG/DL
HDLC SERPL: 3 (ref 0–5)
HGB BLD-MCNC: 15.1 G/DL (ref 12.1–17)
LDLC SERPL CALC-MCNC: 48.4 MG/DL (ref 0–100)
LIPID PANEL: NORMAL
LV EF: 55 ML
MAGNESIUM SERPL-MCNC: 2 MG/DL (ref 1.6–2.4)
MAGNESIUM SERPL-MCNC: 2.1 MG/DL (ref 1.6–2.4)
MCH RBC QN AUTO: 33.1 PG (ref 26–34)
MCHC RBC AUTO-ENTMCNC: 34.4 G/DL (ref 30–36.5)
MCV RBC AUTO: 96.3 FL (ref 80–99)
NRBC # BLD: 0 K/UL (ref 0–0.01)
NRBC BLD-RTO: 0 PER 100 WBC
PLATELET # BLD AUTO: 132 K/UL (ref 150–400)
PMV BLD AUTO: 12.4 FL (ref 8.9–12.9)
POTASSIUM SERPL-SCNC: 4.2 MMOL/L (ref 3.5–5.1)
RBC # BLD AUTO: 4.56 M/UL (ref 4.1–5.7)
SODIUM SERPL-SCNC: 139 MMOL/L (ref 136–145)
THERAPEUTIC RANGE: ABNORMAL SEC (ref 82–109)
TRIGL SERPL-MCNC: 108 MG/DL
TROPONIN I SERPL HS-MCNC: 398 NG/L (ref 0–76)
VLDLC SERPL CALC-MCNC: 21.6 MG/DL
WBC # BLD AUTO: 7.6 K/UL (ref 4.1–11.1)

## 2025-05-05 PROCEDURE — 6370000000 HC RX 637 (ALT 250 FOR IP)

## 2025-05-05 PROCEDURE — 94761 N-INVAS EAR/PLS OXIMETRY MLT: CPT

## 2025-05-05 PROCEDURE — 85730 THROMBOPLASTIN TIME PARTIAL: CPT

## 2025-05-05 PROCEDURE — 6360000002 HC RX W HCPCS: Performed by: EMERGENCY MEDICINE

## 2025-05-05 PROCEDURE — 2060000000 HC ICU INTERMEDIATE R&B

## 2025-05-05 PROCEDURE — 93005 ELECTROCARDIOGRAM TRACING: CPT | Performed by: INTERNAL MEDICINE

## 2025-05-05 PROCEDURE — 84484 ASSAY OF TROPONIN QUANT: CPT

## 2025-05-05 PROCEDURE — 2500000003 HC RX 250 WO HCPCS

## 2025-05-05 PROCEDURE — 83036 HEMOGLOBIN GLYCOSYLATED A1C: CPT

## 2025-05-05 PROCEDURE — 93005 ELECTROCARDIOGRAM TRACING: CPT

## 2025-05-05 PROCEDURE — 93306 TTE W/DOPPLER COMPLETE: CPT

## 2025-05-05 PROCEDURE — 83735 ASSAY OF MAGNESIUM: CPT

## 2025-05-05 PROCEDURE — 85027 COMPLETE CBC AUTOMATED: CPT

## 2025-05-05 PROCEDURE — 36415 COLL VENOUS BLD VENIPUNCTURE: CPT

## 2025-05-05 PROCEDURE — 80061 LIPID PANEL: CPT

## 2025-05-05 PROCEDURE — 80048 BASIC METABOLIC PNL TOTAL CA: CPT

## 2025-05-05 RX ORDER — MAGNESIUM HYDROXIDE/ALUMINUM HYDROXICE/SIMETHICONE 120; 1200; 1200 MG/30ML; MG/30ML; MG/30ML
30 SUSPENSION ORAL
Status: COMPLETED | OUTPATIENT
Start: 2025-05-05 | End: 2025-05-05

## 2025-05-05 RX ADMIN — HEPARIN SODIUM AND DEXTROSE 14 UNITS/KG/HR: 10000; 5 INJECTION INTRAVENOUS at 22:15

## 2025-05-05 RX ADMIN — HEPARIN SODIUM 2000 UNITS: 1000 INJECTION INTRAVENOUS; SUBCUTANEOUS at 03:57

## 2025-05-05 RX ADMIN — OXYCODONE HYDROCHLORIDE 10 MG: 10 TABLET ORAL at 22:26

## 2025-05-05 RX ADMIN — FAMOTIDINE 20 MG: 20 TABLET, FILM COATED ORAL at 08:39

## 2025-05-05 RX ADMIN — ASPIRIN 81 MG 81 MG: 81 TABLET ORAL at 08:39

## 2025-05-05 RX ADMIN — ATORVASTATIN CALCIUM 40 MG: 40 TABLET, FILM COATED ORAL at 08:39

## 2025-05-05 RX ADMIN — POLYETHYLENE GLYCOL 3350 17 G: 17 POWDER, FOR SOLUTION ORAL at 11:26

## 2025-05-05 RX ADMIN — CETIRIZINE HYDROCHLORIDE 5 MG: 10 TABLET, FILM COATED ORAL at 08:39

## 2025-05-05 RX ADMIN — ACETAMINOPHEN 650 MG: 325 TABLET ORAL at 11:26

## 2025-05-05 RX ADMIN — OXYCODONE HYDROCHLORIDE 10 MG: 10 TABLET ORAL at 17:51

## 2025-05-05 RX ADMIN — ACETAMINOPHEN 650 MG: 325 TABLET ORAL at 00:03

## 2025-05-05 RX ADMIN — HEPARIN SODIUM 2000 UNITS: 1000 INJECTION INTRAVENOUS; SUBCUTANEOUS at 22:16

## 2025-05-05 RX ADMIN — SODIUM CHLORIDE, PRESERVATIVE FREE 10 ML: 5 INJECTION INTRAVENOUS at 08:39

## 2025-05-05 RX ADMIN — ALUMINUM HYDROXIDE, MAGNESIUM HYDROXIDE, AND SIMETHICONE 30 ML: 200; 200; 20 SUSPENSION ORAL at 18:43

## 2025-05-05 RX ADMIN — FAMOTIDINE 20 MG: 20 TABLET, FILM COATED ORAL at 20:07

## 2025-05-05 RX ADMIN — HEPARIN SODIUM AND DEXTROSE 12 UNITS/KG/HR: 10000; 5 INJECTION INTRAVENOUS at 18:06

## 2025-05-05 RX ADMIN — SODIUM CHLORIDE, PRESERVATIVE FREE 10 ML: 5 INJECTION INTRAVENOUS at 20:07

## 2025-05-05 ASSESSMENT — PAIN DESCRIPTION - ONSET: ONSET: GRADUAL

## 2025-05-05 ASSESSMENT — PAIN DESCRIPTION - LOCATION
LOCATION: ARM
LOCATION: CHEST
LOCATION: BACK
LOCATION: HEAD

## 2025-05-05 ASSESSMENT — PAIN - FUNCTIONAL ASSESSMENT: PAIN_FUNCTIONAL_ASSESSMENT: ACTIVITIES ARE NOT PREVENTED

## 2025-05-05 ASSESSMENT — PAIN DESCRIPTION - FREQUENCY: FREQUENCY: INTERMITTENT

## 2025-05-05 ASSESSMENT — PAIN SCALES - GENERAL
PAINLEVEL_OUTOF10: 8
PAINLEVEL_OUTOF10: 7
PAINLEVEL_OUTOF10: 6
PAINLEVEL_OUTOF10: 0
PAINLEVEL_OUTOF10: 0
PAINLEVEL_OUTOF10: 3

## 2025-05-05 ASSESSMENT — PAIN DESCRIPTION - DESCRIPTORS
DESCRIPTORS: SORE;DISCOMFORT
DESCRIPTORS: THROBBING

## 2025-05-05 ASSESSMENT — PAIN DESCRIPTION - PAIN TYPE: TYPE: ACUTE PAIN

## 2025-05-05 ASSESSMENT — PAIN DESCRIPTION - ORIENTATION: ORIENTATION: LEFT

## 2025-05-05 NOTE — PLAN OF CARE
Problem: Discharge Planning  Goal: Discharge to home or other facility with appropriate resources  5/4/2025 2301 by Pascale Presley RN  Outcome: Progressing  5/4/2025 1326 by Mitra Stover RN  Outcome: Progressing  Flowsheets (Taken 5/4/2025 0930)  Discharge to home or other facility with appropriate resources:   Identify barriers to discharge with patient and caregiver   Arrange for needed discharge resources and transportation as appropriate   Identify discharge learning needs (meds, wound care, etc)   Refer to discharge planning if patient needs post-hospital services based on physician order or complex needs related to functional status, cognitive ability or social support system     Problem: Pain  Goal: Verbalizes/displays adequate comfort level or baseline comfort level  5/4/2025 2301 by Pascale Presley RN  Outcome: Progressing  5/4/2025 1326 by Mitra Stover, RN  Outcome: Progressing  Flowsheets (Taken 5/4/2025 0930)  Verbalizes/displays adequate comfort level or baseline comfort level:   Encourage patient to monitor pain and request assistance   Assess pain using appropriate pain scale   Implement non-pharmacological measures as appropriate and evaluate response   Administer analgesics based on type and severity of pain and evaluate response   Consider cultural and social influences on pain and pain management   Notify Licensed Independent Practitioner if interventions unsuccessful or patient reports new pain     Problem: ABCDS Injury Assessment  Goal: Absence of physical injury  Outcome: Progressing  Flowsheets (Taken 5/4/2025 1335 by Mitra Stover, RN)  Absence of Physical Injury: Implement safety measures based on patient assessment

## 2025-05-05 NOTE — PROGRESS NOTES
Hospitalist Progress Note               Daily Progress Note: 5/5/2025      Hospital Day: 2     Chief complaint:   Chief Complaint   Patient presents with    Chest Pain        Brief HPI/ Hospital course to date:  Shaheed Hair is a 69 y.o.  male with PMHx significant for HTN, HLD, DVT on Eliquis who presents due to chest pain started at 3 AM.     Patient states that his chest pain started around 3 AM.  It felt like a stabbing pain in his chest.  He states it was 9 out of 10, but better now.  It is intermittent.  It sometimes radiates to his arm but then feels like in his chest.  He thought it was acid reflux as he kept on burping.  He is unable to describe it as much.  Never had this before.  Denies fever, cough, palpitations, shortness of breath, abdominal pain, BM changes, urinary symptoms.  Reports compliance all of his medications.  States he follows up with a hematologist for his DVT and is post to be on 3 more months of Eliquis.  He states that he was started on it because of blood clots in both legs due to having some issue with his legs.  Otherwise, reports being fairly healthy.  Denies smoking history except when he was a teenager.  Denies alcohol use.  Denies recreational drug use.        In the ED, patient was afebrile and vital signs were stable.  CBC was unremarkable.  BMP showed mild hyponatremia with 146, hypokalemia 3.0, hypocalcemia 6.4, hypomagnesemia 1.4.  Troponin was elevated at 893.  EKG showed no sign of ischemia.  D-dimer was negative.  Chest x-ray showed no sign of infection.  CTA showed no PE.     In the ED, he was given calcium and started on heparin drip.  Cardiology was consulted.     We were asked to admit for work up and evaluation of the above problems.     --------  Patient is seen today for follow-up.   He had just come back from his echo.  His chest pain is getting better.  Does have a small frontal headache 3 out of 10 alleviated with Tylenol.  No neurological symptoms.  Denies

## 2025-05-05 NOTE — PROGRESS NOTES
Progress Note      5/5/2025 3:10 PM  NAME: Shaheed Hair   MRN:  [id number]   Admit Diagnosis: Hypokalemia [E87.6]  NSTEMI (non-ST elevated myocardial infarction) (HCC) [I21.4]  Chest pain, unspecified type [R07.9]          Assessment/Plan:   NSTEMI, chest pain-free, continues heparin.  Cardiac cath in AM.  Procedure discussed, consent obtained.    DVT, was on Eliquis.  On hold last dose was yesterday morning.  Continues heparin.    Hypertension, well-controlled      Hypercholesterolemia, on atorvastatin.  Repeat lipid profile.    Prediabetes, check hemoglobin A1c.    Brain and aortic aneurysm in dad.    With patient on Eliquis will use mono antiplatelet therapy with contraindication for dual antiplatelet therapy.                 high complexity decision making was performed in this patient at high risk for decompensation with multiple organ involvement.    Subjective:     Shaheed Hair denies chest pain, dyspnea.  Discussed with RN events overnight.     Review of Systems:    Symptom Y/N Comments  Symptom Y/N Comments   Fever/Chills N   Chest Pain N    Poor Appetite N   Edema N    Cough N   Abdominal Pain N    Sputum N   Joint Pain N    SOB/FARIA N   Pruritis/Rash N    Nausea/vomit N   Tolerating PT/OT Y    Diarrhea N   Tolerating Diet Y    Constipation N   Other       Could NOT obtain due to:      Objective:      Physical Exam:    Last 24hrs VS reviewed since prior progress note. Most recent are:    /66   Pulse (!) 48   Temp 97.7 °F (36.5 °C) (Oral)   Resp 18   Ht 1.803 m (5' 11\")   Wt 97.5 kg (215 lb)   SpO2 95%   BMI 29.99 kg/m²     Intake/Output Summary (Last 24 hours) at 5/5/2025 1510  Last data filed at 5/4/2025 2025  Gross per 24 hour   Intake 1040 ml   Output --   Net 1040 ml        General Appearance: Well developed, well nourished, alert; no acute distress.  Ears/Nose/Mouth/Throat: Hearing grossly normal; moist mucous membranes  Neck: Supple.  Chest: Lungs clear to auscultation

## 2025-05-05 NOTE — PROGRESS NOTES
CM reviewed chart and noted patient currently lives at home with his daughter and will return home with her at discharge. CM not anticipating any discharge needs.     Barriers to discharge-  Patient is pending echo and is scheduled for a cardiac cath today. Remains on heparin gtt.

## 2025-05-05 NOTE — PROGRESS NOTES
[facility] at [address] [address]  Phone: [phone]      Progress Note      5/5/2025 12:22 PM  NAME: Shaheed Hair   MRN:  [id number]   Admit Diagnosis: Hypokalemia [E87.6]  NSTEMI (non-ST elevated myocardial infarction) (HCC) [I21.4]  Chest pain, unspecified type [R07.9]          Assessment/Plan:     Status post non-STEMI.  Patient currently free of anginal symptoms.  Hemodynamically stable.  Continue aspirin and IV heparin with high-dose statin.  Beta-blocker on hold due to patient's low blood pressure and bradycardia.  Echo today shows relatively preserved LV systolic function with moderate MR with posteriorly directed jet.  Cardiac cath tomorrow by Dr. Boyd.  History of DVT.  Has been on Eliquis.  Hypertension, stable  Hypercholesteremia, continue statin.         []       High complexity decision making was performed in this patient at high risk for decompensation with multiple organ involvement.    Subjective:     Shaheed Hair denies chest pain, dyspnea.  Discussed with RN events overnight.     Review of Systems:     []         Unable to obtain  ROS due to ---   [x]         Total of 12 systems reviewed as follows:     Constitutional: negative fever, negative chills, negative weight loss  Eyes:               negative visual changes  ENT:                negative sore throat, tongue or lip swelling  Respiratory:     negative cough, negative dyspnea  Cards:             As per HPI  GI:                   negative for nausea, vomiting, diarrhea, and abdominal pain  Genitourinary: negative for frequency, dysuria  Integument:     negative for rash   Hematologic:   negative for easy bruising and gum/nose bleeding  Musculoskel:   negative for myalgias,  back pain  Neurological:   negative for headaches, dizziness, vertigo, weakness  Behavl/Psych: negative for feelings of anxiety, depression     Objective:      Physical Exam:    Last 24hrs VS reviewed since prior

## 2025-05-06 LAB
ANION GAP SERPL CALC-SCNC: 5 MMOL/L (ref 2–12)
APTT PPP: >153 SEC (ref 21.2–34.1)
BUN SERPL-MCNC: 16 MG/DL (ref 6–20)
BUN/CREAT SERPL: 17 (ref 12–20)
CA-I BLD-MCNC: 8.6 MG/DL (ref 8.5–10.1)
CHLORIDE SERPL-SCNC: 110 MMOL/L (ref 97–108)
CO2 SERPL-SCNC: 28 MMOL/L (ref 21–32)
CREAT SERPL-MCNC: 0.96 MG/DL (ref 0.7–1.3)
ECHO BSA: 2.21 M2
EKG ATRIAL RATE: 54 BPM
EKG ATRIAL RATE: 64 BPM
EKG DIAGNOSIS: NORMAL
EKG DIAGNOSIS: NORMAL
EKG P AXIS: 43 DEGREES
EKG P AXIS: 44 DEGREES
EKG P-R INTERVAL: 174 MS
EKG P-R INTERVAL: 180 MS
EKG Q-T INTERVAL: 460 MS
EKG Q-T INTERVAL: 508 MS
EKG QRS DURATION: 106 MS
EKG QRS DURATION: 106 MS
EKG QTC CALCULATION (BAZETT): 474 MS
EKG QTC CALCULATION (BAZETT): 481 MS
EKG R AXIS: -4 DEGREES
EKG R AXIS: 17 DEGREES
EKG T AXIS: 151 DEGREES
EKG T AXIS: 175 DEGREES
EKG VENTRICULAR RATE: 54 BPM
EKG VENTRICULAR RATE: 64 BPM
ERYTHROCYTE [DISTWIDTH] IN BLOOD BY AUTOMATED COUNT: 12.8 % (ref 11.5–14.5)
EST. AVERAGE GLUCOSE BLD GHB EST-MCNC: 123 MG/DL
GLUCOSE SERPL-MCNC: 88 MG/DL (ref 65–100)
HBA1C MFR BLD: 5.9 % (ref 4–5.6)
HCT VFR BLD AUTO: 44.2 % (ref 36.6–50.3)
HGB BLD-MCNC: 15.1 G/DL (ref 12.1–17)
MCH RBC QN AUTO: 32.7 PG (ref 26–34)
MCHC RBC AUTO-ENTMCNC: 34.2 G/DL (ref 30–36.5)
MCV RBC AUTO: 95.7 FL (ref 80–99)
NRBC # BLD: 0 K/UL (ref 0–0.01)
NRBC BLD-RTO: 0 PER 100 WBC
PLATELET # BLD AUTO: 125 K/UL (ref 150–400)
PMV BLD AUTO: 12.3 FL (ref 8.9–12.9)
POTASSIUM SERPL-SCNC: 4.1 MMOL/L (ref 3.5–5.1)
RBC # BLD AUTO: 4.62 M/UL (ref 4.1–5.7)
SODIUM SERPL-SCNC: 143 MMOL/L (ref 136–145)
THERAPEUTIC RANGE: ABNORMAL SEC (ref 82–109)
WBC # BLD AUTO: 8.2 K/UL (ref 4.1–11.1)

## 2025-05-06 PROCEDURE — 6370000000 HC RX 637 (ALT 250 FOR IP)

## 2025-05-06 PROCEDURE — C1894 INTRO/SHEATH, NON-LASER: HCPCS | Performed by: INTERNAL MEDICINE

## 2025-05-06 PROCEDURE — 36415 COLL VENOUS BLD VENIPUNCTURE: CPT

## 2025-05-06 PROCEDURE — 93005 ELECTROCARDIOGRAM TRACING: CPT | Performed by: INTERNAL MEDICINE

## 2025-05-06 PROCEDURE — 80048 BASIC METABOLIC PNL TOTAL CA: CPT

## 2025-05-06 PROCEDURE — 6360000002 HC RX W HCPCS: Performed by: NURSE PRACTITIONER

## 2025-05-06 PROCEDURE — 93458 L HRT ARTERY/VENTRICLE ANGIO: CPT | Performed by: INTERNAL MEDICINE

## 2025-05-06 PROCEDURE — 2500000003 HC RX 250 WO HCPCS

## 2025-05-06 PROCEDURE — 4A023N7 MEASUREMENT OF CARDIAC SAMPLING AND PRESSURE, LEFT HEART, PERCUTANEOUS APPROACH: ICD-10-PCS | Performed by: INTERNAL MEDICINE

## 2025-05-06 PROCEDURE — 85027 COMPLETE CBC AUTOMATED: CPT

## 2025-05-06 PROCEDURE — 85730 THROMBOPLASTIN TIME PARTIAL: CPT

## 2025-05-06 PROCEDURE — 7100000001 HC PACU RECOVERY - ADDTL 15 MIN: Performed by: INTERNAL MEDICINE

## 2025-05-06 PROCEDURE — 6360000004 HC RX CONTRAST MEDICATION: Performed by: INTERNAL MEDICINE

## 2025-05-06 PROCEDURE — 2500000003 HC RX 250 WO HCPCS: Performed by: INTERNAL MEDICINE

## 2025-05-06 PROCEDURE — 99152 MOD SED SAME PHYS/QHP 5/>YRS: CPT | Performed by: INTERNAL MEDICINE

## 2025-05-06 PROCEDURE — 2709999900 HC NON-CHARGEABLE SUPPLY: Performed by: INTERNAL MEDICINE

## 2025-05-06 PROCEDURE — 6360000002 HC RX W HCPCS: Performed by: INTERNAL MEDICINE

## 2025-05-06 PROCEDURE — 6370000000 HC RX 637 (ALT 250 FOR IP): Performed by: INTERNAL MEDICINE

## 2025-05-06 PROCEDURE — C1769 GUIDE WIRE: HCPCS | Performed by: INTERNAL MEDICINE

## 2025-05-06 PROCEDURE — 2060000000 HC ICU INTERMEDIATE R&B

## 2025-05-06 PROCEDURE — B2111ZZ FLUOROSCOPY OF MULTIPLE CORONARY ARTERIES USING LOW OSMOLAR CONTRAST: ICD-10-PCS | Performed by: INTERNAL MEDICINE

## 2025-05-06 PROCEDURE — 7100000000 HC PACU RECOVERY - FIRST 15 MIN: Performed by: INTERNAL MEDICINE

## 2025-05-06 RX ORDER — FENTANYL CITRATE 50 UG/ML
INJECTION, SOLUTION INTRAMUSCULAR; INTRAVENOUS PRN
Status: DISCONTINUED | OUTPATIENT
Start: 2025-05-06 | End: 2025-05-06 | Stop reason: HOSPADM

## 2025-05-06 RX ORDER — IOPAMIDOL 755 MG/ML
INJECTION, SOLUTION INTRAVASCULAR PRN
Status: DISCONTINUED | OUTPATIENT
Start: 2025-05-06 | End: 2025-05-06 | Stop reason: HOSPADM

## 2025-05-06 RX ORDER — HEPARIN SODIUM 200 [USP'U]/100ML
INJECTION, SOLUTION INTRAVENOUS CONTINUOUS PRN
Status: COMPLETED | OUTPATIENT
Start: 2025-05-06 | End: 2025-05-06

## 2025-05-06 RX ORDER — SODIUM CHLORIDE 9 MG/ML
INJECTION, SOLUTION INTRAVENOUS CONTINUOUS
Status: DISPENSED | OUTPATIENT
Start: 2025-05-06 | End: 2025-05-06

## 2025-05-06 RX ORDER — HEPARIN SODIUM 1000 [USP'U]/ML
INJECTION, SOLUTION INTRAVENOUS; SUBCUTANEOUS PRN
Status: DISCONTINUED | OUTPATIENT
Start: 2025-05-06 | End: 2025-05-06 | Stop reason: HOSPADM

## 2025-05-06 RX ORDER — SODIUM CHLORIDE 0.9 % (FLUSH) 0.9 %
5-40 SYRINGE (ML) INJECTION PRN
Status: DISCONTINUED | OUTPATIENT
Start: 2025-05-06 | End: 2025-05-07 | Stop reason: HOSPADM

## 2025-05-06 RX ORDER — LIDOCAINE HYDROCHLORIDE 10 MG/ML
INJECTION, SOLUTION INFILTRATION; PERINEURAL PRN
Status: DISCONTINUED | OUTPATIENT
Start: 2025-05-06 | End: 2025-05-06 | Stop reason: HOSPADM

## 2025-05-06 RX ORDER — MIDAZOLAM HYDROCHLORIDE 1 MG/ML
INJECTION, SOLUTION INTRAMUSCULAR; INTRAVENOUS PRN
Status: DISCONTINUED | OUTPATIENT
Start: 2025-05-06 | End: 2025-05-06 | Stop reason: HOSPADM

## 2025-05-06 RX ORDER — SODIUM CHLORIDE 0.9 % (FLUSH) 0.9 %
5-40 SYRINGE (ML) INJECTION EVERY 12 HOURS SCHEDULED
Status: DISCONTINUED | OUTPATIENT
Start: 2025-05-06 | End: 2025-05-07 | Stop reason: HOSPADM

## 2025-05-06 RX ORDER — ACETAMINOPHEN 325 MG/1
650 TABLET ORAL EVERY 4 HOURS PRN
Status: DISCONTINUED | OUTPATIENT
Start: 2025-05-06 | End: 2025-05-07 | Stop reason: HOSPADM

## 2025-05-06 RX ORDER — ONDANSETRON 2 MG/ML
4 INJECTION INTRAMUSCULAR; INTRAVENOUS EVERY 6 HOURS PRN
Status: DISCONTINUED | OUTPATIENT
Start: 2025-05-06 | End: 2025-05-07 | Stop reason: HOSPADM

## 2025-05-06 RX ADMIN — APIXABAN 5 MG: 5 TABLET, FILM COATED ORAL at 20:34

## 2025-05-06 RX ADMIN — FAMOTIDINE 20 MG: 20 TABLET, FILM COATED ORAL at 20:34

## 2025-05-06 RX ADMIN — SODIUM CHLORIDE, PRESERVATIVE FREE 10 ML: 5 INJECTION INTRAVENOUS at 09:46

## 2025-05-06 RX ADMIN — OXYCODONE HYDROCHLORIDE 10 MG: 10 TABLET ORAL at 03:42

## 2025-05-06 RX ADMIN — ONDANSETRON 4 MG: 2 INJECTION, SOLUTION INTRAMUSCULAR; INTRAVENOUS at 07:08

## 2025-05-06 RX ADMIN — OXYCODONE 5 MG: 5 TABLET ORAL at 15:16

## 2025-05-06 RX ADMIN — FAMOTIDINE 20 MG: 20 TABLET, FILM COATED ORAL at 09:45

## 2025-05-06 RX ADMIN — ATORVASTATIN CALCIUM 40 MG: 40 TABLET, FILM COATED ORAL at 09:45

## 2025-05-06 RX ADMIN — OXYCODONE HYDROCHLORIDE 10 MG: 10 TABLET ORAL at 20:51

## 2025-05-06 RX ADMIN — POLYETHYLENE GLYCOL 3350 17 G: 17 POWDER, FOR SOLUTION ORAL at 20:44

## 2025-05-06 RX ADMIN — CETIRIZINE HYDROCHLORIDE 5 MG: 10 TABLET, FILM COATED ORAL at 09:44

## 2025-05-06 RX ADMIN — SODIUM CHLORIDE, PRESERVATIVE FREE 10 ML: 5 INJECTION INTRAVENOUS at 20:34

## 2025-05-06 RX ADMIN — ASPIRIN 81 MG 81 MG: 81 TABLET ORAL at 07:25

## 2025-05-06 RX ADMIN — APIXABAN 5 MG: 5 TABLET, FILM COATED ORAL at 09:44

## 2025-05-06 ASSESSMENT — PAIN SCALES - GENERAL
PAINLEVEL_OUTOF10: 0
PAINLEVEL_OUTOF10: 7
PAINLEVEL_OUTOF10: 6
PAINLEVEL_OUTOF10: 6
PAINLEVEL_OUTOF10: 0
PAINLEVEL_OUTOF10: 0
PAINLEVEL_OUTOF10: 8
PAINLEVEL_OUTOF10: 0
PAINLEVEL_OUTOF10: 0

## 2025-05-06 ASSESSMENT — PAIN DESCRIPTION - LOCATION
LOCATION: BACK
LOCATION: ARM
LOCATION: ARM

## 2025-05-06 ASSESSMENT — PAIN DESCRIPTION - ORIENTATION
ORIENTATION: RIGHT
ORIENTATION: RIGHT

## 2025-05-06 ASSESSMENT — PAIN DESCRIPTION - DESCRIPTORS
DESCRIPTORS: DULL;ACHING
DESCRIPTORS: THROBBING
DESCRIPTORS: DISCOMFORT;CRAMPING

## 2025-05-06 ASSESSMENT — PAIN SCALES - WONG BAKER: WONGBAKER_NUMERICALRESPONSE: NO HURT

## 2025-05-06 NOTE — PROGRESS NOTES
An outpatient cardiac referral has been made to [facility]. Patient's information was forwarded to this facility. Patient will be contacted by this facility to schedule an initial appointment. This referral information has been included in the patient's discharge instructions. Contact information for cardiac rehab facility was provided as well.

## 2025-05-06 NOTE — PROGRESS NOTES
Hospitalist Progress Note               Daily Progress Note: 5/6/2025      Hospital Day: 3     Chief complaint:   Chief Complaint   Patient presents with    Chest Pain        Brief HPI/ Hospital course to date:  Shaheed Hair is a 69 y.o.  male with PMHx significant for HTN, HLD, DVT on Eliquis who presents due to chest pain started at 3 AM. 5/5 echo EF 50-55%, moderate septal thickening, regional wall motion abnormalities, hypokinesis inferior basal, normal diastolic function, moderate mitral regurgitation, left atrium dilated.  On heparin infusion as patient is on Eliquis at home for DVT prophylaxis.  CTA unremarkable for PE.  Cardiology on board, beta-blockers held due to soft blood pressure and bradycardia, undergoing cardiac cath 5/6/2025.  Discharge today versus tomorrow pending cardiac clearance.     --------  Patient is seen today for follow-up.   Patient is overall doing well today, no swelling/bruising/discomfort noted around right radial artery site.  Denies chest pain, shortness of breath, nausea, vomiting, lightheaded/dizziness, change in bowel micturition.    All ROS negative otherwise mentioned above.      Assessment and Plan:    NSTEMI  - Patient presented with chest pain  - Troponin trending up to 893-1632  - EKG with no sign of ischemia  - Echo 5/5: EF 50 to 55%, moderate septal thickening, regional wall motion abnormalities, hypokinesis inferior basal, normal diastolic function  - S/p heparin, on Eliquis 5 mg twice daily  - Started on aspirin, atorvastatin  - Hold off on beta-blocker due to BPs and some episodes of bradycardia  - Cardiology consulted and appreciate recommendations.    - Continue telemetry      DVT  - On Eliquis at home  - On heparin drip however now  - CTA with no PE     HTN  -Held lisinopril given hypotension     Pt's BRAD Score = 4     BRAD Score Calculation (1 point for each):  - Age >= 65  - Aspirin use in the last 7 days   - At least 2 angina episodes within the last

## 2025-05-06 NOTE — DISCHARGE INSTRUCTIONS
Cardiac rehab is a very important way to help you  to feel better and stronger more quickly  and reduce the risks of heart problems in the future.     Cardiac rehabilitation services are provided at:  [facility]  [address] [place] [address], [address]  [place], [place] [address] [phone]    There are also many other locations that provide this service.   You can be referred to any location nearest to  where you live or work to best accommodate your needs    Someone will contact you to schedule an initial assessment to begin cardiac rehabilitation

## 2025-05-06 NOTE — PROGRESS NOTES
Progress Note      5/6/2025 11:29 AM  NAME: Shaheed Hair   MRN:  [id number]   Admit Diagnosis: Hypokalemia [E87.6]  NSTEMI (non-ST elevated myocardial infarction) (HCC) [I21.4]  Chest pain, unspecified type [R07.9]      Problem List:   NSTEMI  History of DVT on Eliquis  Hyperlipidemia  Hypertension     Assessment/Plan:   Cardiac cath today with widely patent arteries--> no significant CAD  TTE yesterday with a EF 50-55%  No additional cardiac studies planned  Up as tolerated         []       High complexity decision making was performed in this patient at high risk for decompensation with multiple organ involvement.    Subjective:     Shaheed Hair denies chest pain, dyspnea.  Discussed with RN events overnight.     Review of Systems:   Negative except for as noted above.    Objective:      Physical Exam:    Last 24hrs VS reviewed since prior progress note. Most recent are:    /64   Pulse 51   Temp 97.9 °F (36.6 °C) (Oral)   Resp 12   Ht 1.803 m (5' 11\")   Wt 99.1 kg (218 lb 7.6 oz)   SpO2 97%   BMI 30.47 kg/m²     Intake/Output Summary (Last 24 hours) at 5/6/2025 1129  Last data filed at 5/6/2025 1033  Gross per 24 hour   Intake --   Output 260 ml   Net -260 ml        General Appearance: Alert; no acute distress.  Ears/Nose/Mouth/Throat: moist mucous membranes  Neck: Supple.  Chest: Lungs clear to auscultation bilaterally.  Cardiovascular: Regular rate and rhythm, S1S2 normal  Abdomen: Soft, non-tender, bowel sounds are active.  Extremities: No edema bilaterally.  Skin: Warm and dry.      PMH/SH reviewed - no change compared to H&P    Telemetry: NSR    Cardiac cath 5/6/2025:   Procedures: Selective coronary angiogram of the left and the right system, left heart catheterization, IV conscious sedation using Versed and fentanyl, radial band for hemostasis.     Hemodynamics: Normal systemic pressures.  LVEDP is 8.  No gradient across the aortic valve.     Short left main, almost separate ostia for

## 2025-05-06 NOTE — PROGRESS NOTES
Pt. Transferred to room via bed in stable condition.  Bedside report given, SBAR reviewed, sites viewed.  Family in room.

## 2025-05-07 VITALS
BODY MASS INDEX: 31.64 KG/M2 | OXYGEN SATURATION: 96 % | SYSTOLIC BLOOD PRESSURE: 110 MMHG | HEART RATE: 50 BPM | RESPIRATION RATE: 18 BRPM | TEMPERATURE: 97.5 F | WEIGHT: 225.97 LBS | HEIGHT: 71 IN | DIASTOLIC BLOOD PRESSURE: 72 MMHG

## 2025-05-07 PROCEDURE — 6370000000 HC RX 637 (ALT 250 FOR IP)

## 2025-05-07 PROCEDURE — 6370000000 HC RX 637 (ALT 250 FOR IP): Performed by: INTERNAL MEDICINE

## 2025-05-07 PROCEDURE — 2500000003 HC RX 250 WO HCPCS

## 2025-05-07 PROCEDURE — 2500000003 HC RX 250 WO HCPCS: Performed by: INTERNAL MEDICINE

## 2025-05-07 RX ORDER — FAMOTIDINE 20 MG/1
20 TABLET, FILM COATED ORAL 2 TIMES DAILY
Qty: 30 TABLET | Refills: 0 | Status: SHIPPED | OUTPATIENT
Start: 2025-05-07

## 2025-05-07 RX ORDER — ATORVASTATIN CALCIUM 40 MG/1
40 TABLET, FILM COATED ORAL DAILY
Qty: 30 TABLET | Refills: 0 | Status: SHIPPED | OUTPATIENT
Start: 2025-05-07

## 2025-05-07 RX ORDER — ASPIRIN 81 MG/1
81 TABLET, CHEWABLE ORAL DAILY
Qty: 30 TABLET | Refills: 0 | Status: SHIPPED | OUTPATIENT
Start: 2025-05-07

## 2025-05-07 RX ADMIN — SODIUM CHLORIDE, PRESERVATIVE FREE 10 ML: 5 INJECTION INTRAVENOUS at 09:22

## 2025-05-07 RX ADMIN — CETIRIZINE HYDROCHLORIDE 5 MG: 10 TABLET, FILM COATED ORAL at 09:20

## 2025-05-07 RX ADMIN — ASPIRIN 81 MG 81 MG: 81 TABLET ORAL at 09:20

## 2025-05-07 RX ADMIN — ATORVASTATIN CALCIUM 40 MG: 40 TABLET, FILM COATED ORAL at 09:20

## 2025-05-07 RX ADMIN — APIXABAN 5 MG: 5 TABLET, FILM COATED ORAL at 09:21

## 2025-05-07 NOTE — PROGRESS NOTES
4 Eyes Skin Assessment     NAME:  Shaheed Hair  YOB: 1955  MEDICAL RECORD NUMBER:  [id number]    The patient is being assessed for  Weekly assessment     I agree that at least one RN has performed a thorough Head to Toe Skin Assessment on the patient. ALL assessment sites listed below have been assessed.      Areas assessed by both nurses:    Head, Face, Ears, Shoulders, Back, Chest, Arms, Elbows, Hands, Sacrum. Buttock, Coccyx, Ischium, Legs. Feet and Heels, and Under Medical Devices         Does the Patient have a Wound? No noted wound(s)       Jorge Prevention initiated by RN: No  Wound Care Orders initiated by RN: No    Pressure Injury (Stage 3,4, Unstageable, DTI, NWPT, and Complex wounds) if present, place Wound referral order by RN under : No    New Ostomies, if present place, Ostomy referral order under : No     Nurse 1 eSignature: Electronically signed by Radha Turner RN on 5/7/25 at 4:37 AM EDT    **SHARE this note so that the co-signing nurse can place an eSignature**    Nurse 2 eSignature: Electronically signed by Karlene Hagan RN on 5/7/25 at 6:10 AM EDT

## 2025-05-07 NOTE — DISCHARGE SUMMARY
Physician Discharge Summary     Patient ID:    Shaheed Hair  [id number]  69 y.o.  1955    Admit date: 5/4/2025    Discharge date : 5/7/2025      Final Diagnoses:   Hypokalemia [E87.6]  NSTEMI (non-ST elevated myocardial infarction) (HCC) [I21.4]  Chest pain, unspecified type [R07.9]  Status post cardiac cath 5/6/2025  DVT on Eliquis  Hypertension    Reason for Hospitalization:  As above    Hospital Course:   Shaheed Hair, a 69-year-old male with a history of hypertension, hyperlipidemia, and a DVT on Eliquis, presented with chest pain. Regarding his NSTEMI, he presented with chest pain and had rising troponin levels (893 to 1632). BRAD score is 4. An echocardiogram revealed an ejection fraction of 50-55%, moderate septal thickening, regional wall motion abnormalities with hypokinesis in the inferior basal region, normal diastolic function, and moderate mitral regurgitation and a dilated left atrium. He was admitted and initiated on heparin infusion due to his home Eliquis for DVT prophylaxis. A CTA ruled out a PE.  Cardiology consulted, and beta-blockers were held due to low blood pressure and bradycardia.  He underwent cardiac catheterization 5/6/2025 that revealed widely patent arteries, no significant CAD.  Patient has been cleared from cardiology standpoint with no further testing intervention to be discharged with outpatient follow-up with PCP and cardiology to go over medication reconciliation and further management, in particular her aspirin and Eliquis intake simultaneously needs to be monitored closely by PCP.    Discharge Medications:      Medication List        START taking these medications      aspirin 81 MG chewable tablet  Take 1 tablet by mouth daily     atorvastatin 40 MG tablet  Commonly known as: LIPITOR  Take 1 tablet by mouth daily     famotidine 20 MG tablet  Commonly known as: PEPCID  Take 1 tablet by mouth 2 times daily            CONTINUE taking these medications

## 2025-05-07 NOTE — PROGRESS NOTES
Progress Note      5/7/2025 12:35 PM  NAME: Sahheed Hair   MRN:  [id number]   Admit Diagnosis: Hypokalemia [E87.6]  NSTEMI (non-ST elevated myocardial infarction) (HCC) [I21.4]  Chest pain, unspecified type [R07.9]      Problem List:   NSTEMI  History of DVT on Eliquis  Hyperlipidemia  Hypertension     Assessment/Plan:   Cardiac cath showed widely patent arteries--> no significant CAD  TTE with a EF 50-55%  No additional cardiac studies planned  Stable for discharge         []       High complexity decision making was performed in this patient at high risk for decompensation with multiple organ involvement.    Subjective:     Shaheed Hair denies chest pain, dyspnea.  Discussed with RN events overnight.     Review of Systems:   Negative except for as noted above.    Objective:      Physical Exam:    Last 24hrs VS reviewed since prior progress note. Most recent are:    /72   Pulse 50   Temp 97.5 °F (36.4 °C) (Oral)   Resp 18   Ht 1.803 m (5' 11\")   Wt 102.5 kg (225 lb 15.5 oz)   SpO2 96%   BMI 31.52 kg/m²     Intake/Output Summary (Last 24 hours) at 5/7/2025 1235  Last data filed at 5/6/2025 1518  Gross per 24 hour   Intake --   Output 750 ml   Net -750 ml        General Appearance: Alert; no acute distress.  Ears/Nose/Mouth/Throat: moist mucous membranes  Neck: Supple.  Chest: Lungs clear to auscultation bilaterally.  Cardiovascular: Regular rate and rhythm, S1S2 normal  Abdomen: Soft, non-tender, bowel sounds are active.  Extremities: No edema bilaterally.  Skin: Warm and dry.      PMH/SH reviewed - no change compared to H&P    Telemetry: NSR    Cardiac cath 5/6/2025:   Procedures: Selective coronary angiogram of the left and the right system, left heart catheterization, IV conscious sedation using Versed and fentanyl, radial band for hemostasis.     Hemodynamics: Normal systemic pressures.  LVEDP is 8.  No gradient across the aortic valve.     Short left main, almost separate ostia for LAD and

## 2025-05-07 NOTE — PROGRESS NOTES
CM noted discharge order. Awaiting cardiac clearance. Should discharge home later today.     Transition of Care Plan:    RUR: 8%  Prior Level of Functioning: independent  Disposition: home  YAHIR: today  If SNF or IPR: Date FOC offered: n/a  Date FOC received: n/a  Accepting facility: n/a  Date authorization started with reference number: n/a  Date authorization received and expires: n/a  Follow up appointments: yes  DME needed: n/a  Transportation at discharge: family  IM/IMM Medicare/ letter given: n/a  Is patient a [employer] and connected with [facility]? N/a   If yes, was [employer] transfer form completed and [facility] notified?   Caregiver Contact: n/a  Discharge Caregiver contacted prior to discharge? N/a  Care Conference needed? N/a  Barriers to discharge: n/a

## 2025-06-10 ENCOUNTER — TELEPHONE (OUTPATIENT)
Age: 70
End: 2025-06-10

## 2025-06-10 NOTE — TELEPHONE ENCOUNTER
Tried to reach patient unable to reach left a message to let patient know that  wants him to have a vascular studies done of his lower extremities done in July once test is done he can be r/s for his appointment in August left number also to Central Scheduling.

## 2025-07-30 ENCOUNTER — HOSPITAL ENCOUNTER (OUTPATIENT)
Facility: HOSPITAL | Age: 70
Discharge: HOME | End: 2025-08-01
Attending: SURGERY
Payer: MEDICARE

## 2025-07-30 DIAGNOSIS — I82.412 DVT OF DEEP FEMORAL VEIN, LEFT (HCC): ICD-10-CM

## 2025-07-30 PROCEDURE — 93970 EXTREMITY STUDY: CPT

## 2025-08-04 ENCOUNTER — OFFICE VISIT (OUTPATIENT)
Age: 70
End: 2025-08-04
Payer: MEDICARE

## 2025-08-04 VITALS
SYSTOLIC BLOOD PRESSURE: 124 MMHG | HEIGHT: 71 IN | RESPIRATION RATE: 18 BRPM | TEMPERATURE: 98.1 F | HEART RATE: 64 BPM | BODY MASS INDEX: 30.38 KG/M2 | WEIGHT: 217 LBS | DIASTOLIC BLOOD PRESSURE: 73 MMHG | OXYGEN SATURATION: 94 %

## 2025-08-04 DIAGNOSIS — I82.412 DVT OF DEEP FEMORAL VEIN, LEFT (HCC): Primary | ICD-10-CM

## 2025-08-04 PROCEDURE — 1126F AMNT PAIN NOTED NONE PRSNT: CPT | Performed by: SURGERY

## 2025-08-04 PROCEDURE — 3017F COLORECTAL CA SCREEN DOC REV: CPT | Performed by: SURGERY

## 2025-08-04 PROCEDURE — 99212 OFFICE O/P EST SF 10 MIN: CPT | Performed by: SURGERY

## 2025-08-04 PROCEDURE — 1123F ACP DISCUSS/DSCN MKR DOCD: CPT | Performed by: SURGERY

## 2025-08-04 PROCEDURE — G8427 DOCREV CUR MEDS BY ELIG CLIN: HCPCS | Performed by: SURGERY

## 2025-08-04 PROCEDURE — 3078F DIAST BP <80 MM HG: CPT | Performed by: SURGERY

## 2025-08-04 PROCEDURE — 3074F SYST BP LT 130 MM HG: CPT | Performed by: SURGERY

## 2025-08-04 PROCEDURE — 1036F TOBACCO NON-USER: CPT | Performed by: SURGERY

## 2025-08-04 PROCEDURE — G8417 CALC BMI ABV UP PARAM F/U: HCPCS | Performed by: SURGERY

## 2025-08-04 ASSESSMENT — PATIENT HEALTH QUESTIONNAIRE - PHQ9
SUM OF ALL RESPONSES TO PHQ QUESTIONS 1-9: 0
2. FEELING DOWN, DEPRESSED OR HOPELESS: NOT AT ALL
1. LITTLE INTEREST OR PLEASURE IN DOING THINGS: NOT AT ALL
SUM OF ALL RESPONSES TO PHQ QUESTIONS 1-9: 0

## (undated) DEVICE — SYRINGE MED 10ML PUR GAM COMPATIBLE POLYCARB FIX M LUER CONN

## (undated) DEVICE — DEVICE COMPR LNG 27 CM VASC BND

## (undated) DEVICE — RADIFOCUS OPTITORQUE ANGIOGRAPHIC CATHETER: Brand: OPTITORQUE

## (undated) DEVICE — TRAY SURG CUST CRD CATH 3 PRT SSR

## (undated) DEVICE — MASK ANES INF SZ 2 PREM TAIL VLV INFL PRT UNSCENTED SGL PT

## (undated) DEVICE — GUIDEWIRE VASC L260CM DIA0.035IN TIP L3MM STD EXCHG PTFE J

## (undated) DEVICE — MASK O2 MED AD 7 FT 3 IN 1 W/ STD CONN LTX

## (undated) DEVICE — RADIFOCUS GLIDEWIRE: Brand: GLIDEWIRE

## (undated) DEVICE — SYRINGE MED 10ML RED POLYCARB BRL FIX M LUER CONN FLAT GRP

## (undated) DEVICE — GLIDESHEATH SLENDER ACCESS KIT: Brand: GLIDESHEATH SLENDER

## (undated) DEVICE — CATHETER DIAG 5FR L100CM LUMN ID0.047IN JL3.5 CRV 0 SIDE H

## (undated) DEVICE — WASTEBAG DRIP/ADAPTER: Brand: MEDLINE INDUSTRIES, INC.

## (undated) DEVICE — 3M™ STERI-DRAPE™ SMALL DRAPE WITH ADHESIVE APERTURE 1092 25/BX,4/CS&#X20;: Brand: STERI-DRAPE™